# Patient Record
Sex: MALE | Race: WHITE | NOT HISPANIC OR LATINO | Employment: FULL TIME | URBAN - METROPOLITAN AREA
[De-identification: names, ages, dates, MRNs, and addresses within clinical notes are randomized per-mention and may not be internally consistent; named-entity substitution may affect disease eponyms.]

---

## 2017-05-08 DIAGNOSIS — J45.40 MODERATE PERSISTENT ASTHMA WITHOUT COMPLICATION: ICD-10-CM

## 2017-05-08 RX ORDER — FLUTICASONE PROPIONATE AND SALMETEROL XINAFOATE 230; 21 UG/1; UG/1
AEROSOL, METERED RESPIRATORY (INHALATION)
Qty: 3 INHALER | Refills: 0 | Status: SHIPPED | OUTPATIENT
Start: 2017-05-08 | End: 2017-08-06

## 2017-05-08 RX ORDER — MONTELUKAST SODIUM 10 MG/1
TABLET ORAL
Qty: 90 TABLET | Refills: 0 | Status: SHIPPED | OUTPATIENT
Start: 2017-05-08 | End: 2017-12-28 | Stop reason: SDUPTHER

## 2017-06-06 DIAGNOSIS — R73.03 PREDIABETES: ICD-10-CM

## 2017-06-06 DIAGNOSIS — E55.9 VITAMIN D DEFICIENCY: ICD-10-CM

## 2017-06-06 DIAGNOSIS — E78.5 HYPERLIPIDEMIA, UNSPECIFIED HYPERLIPIDEMIA TYPE: ICD-10-CM

## 2017-06-06 DIAGNOSIS — R74.01 ELEVATED ALT MEASUREMENT: ICD-10-CM

## 2017-06-30 LAB
25(OH)D3+25(OH)D2 SERPL-MCNC: 16.9 NG/ML (ref 30–100)
ALBUMIN SERPL-MCNC: 4.1 G/DL (ref 3.5–5.2)
ALBUMIN/GLOB SERPL: 1.5 G/DL
ALP SERPL-CCNC: 66 U/L (ref 39–117)
ALT SERPL-CCNC: 43 U/L (ref 1–41)
AST SERPL-CCNC: 23 U/L (ref 1–40)
BASOPHILS # BLD AUTO: 0.14 10*3/MM3 (ref 0–0.2)
BASOPHILS NFR BLD AUTO: 1.4 % (ref 0–1.5)
BILIRUB SERPL-MCNC: 0.2 MG/DL (ref 0.1–1.2)
BUN SERPL-MCNC: 13 MG/DL (ref 6–20)
BUN/CREAT SERPL: 16.9 (ref 7–25)
CALCIUM SERPL-MCNC: 9.3 MG/DL (ref 8.6–10.5)
CHLORIDE SERPL-SCNC: 104 MMOL/L (ref 98–107)
CHOLEST SERPL-MCNC: 191 MG/DL (ref 0–200)
CO2 SERPL-SCNC: 25.2 MMOL/L (ref 22–29)
CREAT SERPL-MCNC: 0.77 MG/DL (ref 0.76–1.27)
EOSINOPHIL # BLD AUTO: 0.39 10*3/MM3 (ref 0–0.7)
EOSINOPHIL NFR BLD AUTO: 4 % (ref 0.3–6.2)
ERYTHROCYTE [DISTWIDTH] IN BLOOD BY AUTOMATED COUNT: 13.9 % (ref 11.5–14.5)
GLOBULIN SER CALC-MCNC: 2.8 GM/DL
GLUCOSE SERPL-MCNC: 128 MG/DL (ref 65–99)
HBA1C MFR BLD: 5.98 % (ref 4.8–5.6)
HCT VFR BLD AUTO: 47.2 % (ref 40.4–52.2)
HDLC SERPL-MCNC: 41 MG/DL (ref 40–60)
HGB BLD-MCNC: 15.2 G/DL (ref 13.7–17.6)
IMM GRANULOCYTES # BLD: 0.02 10*3/MM3 (ref 0–0.03)
IMM GRANULOCYTES NFR BLD: 0.2 % (ref 0–0.5)
LDLC SERPL CALC-MCNC: 107 MG/DL (ref 0–100)
LDLC/HDLC SERPL: 2.6 {RATIO}
LYMPHOCYTES # BLD AUTO: 2.61 10*3/MM3 (ref 0.9–4.8)
LYMPHOCYTES NFR BLD AUTO: 26.9 % (ref 19.6–45.3)
MCH RBC QN AUTO: 27.4 PG (ref 27–32.7)
MCHC RBC AUTO-ENTMCNC: 32.2 G/DL (ref 32.6–36.4)
MCV RBC AUTO: 85 FL (ref 79.8–96.2)
MONOCYTES # BLD AUTO: 1.15 10*3/MM3 (ref 0.2–1.2)
MONOCYTES NFR BLD AUTO: 11.8 % (ref 5–12)
NEUTROPHILS # BLD AUTO: 5.4 10*3/MM3 (ref 1.9–8.1)
NEUTROPHILS NFR BLD AUTO: 55.7 % (ref 42.7–76)
PLATELET # BLD AUTO: 257 10*3/MM3 (ref 140–500)
POTASSIUM SERPL-SCNC: 4.6 MMOL/L (ref 3.5–5.2)
PROT SERPL-MCNC: 6.9 G/DL (ref 6–8.5)
RBC # BLD AUTO: 5.55 10*6/MM3 (ref 4.6–6)
SODIUM SERPL-SCNC: 143 MMOL/L (ref 136–145)
TRIGL SERPL-MCNC: 216 MG/DL (ref 0–150)
TSH SERPL DL<=0.005 MIU/L-ACNC: 2.32 MIU/ML (ref 0.27–4.2)
VLDLC SERPL CALC-MCNC: 43.2 MG/DL (ref 5–40)
WBC # BLD AUTO: 9.71 10*3/MM3 (ref 4.5–10.7)

## 2017-07-07 ENCOUNTER — OFFICE VISIT (OUTPATIENT)
Dept: INTERNAL MEDICINE | Facility: CLINIC | Age: 47
End: 2017-07-07

## 2017-07-07 VITALS
WEIGHT: 272.4 LBS | BODY MASS INDEX: 38.14 KG/M2 | HEIGHT: 71 IN | DIASTOLIC BLOOD PRESSURE: 84 MMHG | SYSTOLIC BLOOD PRESSURE: 130 MMHG | HEART RATE: 93 BPM | OXYGEN SATURATION: 97 %

## 2017-07-07 DIAGNOSIS — R73.03 PREDIABETES: Primary | ICD-10-CM

## 2017-07-07 DIAGNOSIS — Z82.49 FAMILY HISTORY OF HEART DISEASE: ICD-10-CM

## 2017-07-07 DIAGNOSIS — R74.01 ELEVATED ALT MEASUREMENT: ICD-10-CM

## 2017-07-07 DIAGNOSIS — E55.9 VITAMIN D DEFICIENCY: ICD-10-CM

## 2017-07-07 DIAGNOSIS — E66.01 MORBID OBESITY DUE TO EXCESS CALORIES (HCC): ICD-10-CM

## 2017-07-07 PROCEDURE — 99214 OFFICE O/P EST MOD 30 MIN: CPT | Performed by: NURSE PRACTITIONER

## 2017-07-07 RX ORDER — ERGOCALCIFEROL 1.25 MG/1
50000 CAPSULE ORAL WEEKLY
Qty: 4 CAPSULE | Refills: 1 | Status: SHIPPED | OUTPATIENT
Start: 2017-07-07 | End: 2017-08-26

## 2017-07-07 NOTE — PROGRESS NOTES
Subjective   Georgi Khanna is a 47 y.o. male who is here to follow up on PreDM, Obesity, and Hyperlipidemia.     History of Present Illness   The patient is here today to F/U on lab work. He is feeling ok.   He is still working a lot of hours at [employer]. He is not doing well with eating well. Still drinking sodas and a lot of caffeine. Just interviewed with Calin Ascencio.     He has not had a sleep study yet.     Asthma- doing well with allergy meds, some difficulty with the heat.     Vit D def- pt is unsure what dose he is taking daily  The following portions of the patient's history were reviewed and updated as appropriate: allergies, current medications, past family history, past medical history, past social history, past surgical history and problem list.    Review of Systems   Constitutional: Negative.    Respiratory: Negative.    Cardiovascular: Negative.    Psychiatric/Behavioral: Negative.    All other systems reviewed and are negative.      Objective   Physical Exam   Constitutional: He appears well-developed and well-nourished.   Neck: Normal range of motion. Neck supple. No thyromegaly present.   Cardiovascular: Normal rate, regular rhythm, normal heart sounds and intact distal pulses.    Pulmonary/Chest: Effort normal and breath sounds normal.   Skin: Skin is warm and dry.   Psychiatric: He has a normal mood and affect. His behavior is normal. Judgment and thought content normal.   EKG- NSR, no baseline for comparison    Assessment/Plan    Orders Only on 06/06/2017   Component Date Value Ref Range Status   • WBC 06/30/2017 9.71  4.50 - 10.70 10*3/mm3 Final   • RBC 06/30/2017 5.55  4.60 - 6.00 10*6/mm3 Final   • Hemoglobin 06/30/2017 15.2  13.7 - 17.6 g/dL Final   • Hematocrit 06/30/2017 47.2  40.4 - 52.2 % Final   • MCV 06/30/2017 85.0  79.8 - 96.2 fL Final   • MCH 06/30/2017 27.4  27.0 - 32.7 pg Final   • MCHC 06/30/2017 32.2* 32.6 - 36.4 g/dL Final   • RDW 06/30/2017 13.9  11.5 - 14.5 % Final   • Platelets  06/30/2017 257  140 - 500 10*3/mm3 Final   • Neutrophil Rel % 06/30/2017 55.7  42.7 - 76.0 % Final   • Lymphocyte Rel % 06/30/2017 26.9  19.6 - 45.3 % Final   • Monocyte Rel % 06/30/2017 11.8  5.0 - 12.0 % Final   • Eosinophil Rel % 06/30/2017 4.0  0.3 - 6.2 % Final   • Basophil Rel % 06/30/2017 1.4  0.0 - 1.5 % Final   • Neutrophils Absolute 06/30/2017 5.40  1.90 - 8.10 10*3/mm3 Final   • Lymphocytes Absolute 06/30/2017 2.61  0.90 - 4.80 10*3/mm3 Final   • Monocytes Absolute 06/30/2017 1.15  0.20 - 1.20 10*3/mm3 Final   • Eosinophils Absolute 06/30/2017 0.39  0.00 - 0.70 10*3/mm3 Final   • Basophils Absolute 06/30/2017 0.14  0.00 - 0.20 10*3/mm3 Final   • Immature Granulocyte Rel % 06/30/2017 0.2  0.0 - 0.5 % Final   • Immature Grans Absolute 06/30/2017 0.02  0.00 - 0.03 10*3/mm3 Final   • Glucose 06/30/2017 128* 65 - 99 mg/dL Final   • BUN 06/30/2017 13  6 - 20 mg/dL Final   • Creatinine 06/30/2017 0.77  0.76 - 1.27 mg/dL Final   • eGFR Non African Am 06/30/2017 109  >60 mL/min/1.73 Final   • eGFR African Am 06/30/2017 132  >60 mL/min/1.73 Final   • BUN/Creatinine Ratio 06/30/2017 16.9  7.0 - 25.0 Final   • Sodium 06/30/2017 143  136 - 145 mmol/L Final   • Potassium 06/30/2017 4.6  3.5 - 5.2 mmol/L Final   • Chloride 06/30/2017 104  98 - 107 mmol/L Final   • Total CO2 06/30/2017 25.2  22.0 - 29.0 mmol/L Final   • Calcium 06/30/2017 9.3  8.6 - 10.5 mg/dL Final   • Total Protein 06/30/2017 6.9  6.0 - 8.5 g/dL Final   • Albumin 06/30/2017 4.10  3.50 - 5.20 g/dL Final   • Globulin 06/30/2017 2.8  gm/dL Final   • A/G Ratio 06/30/2017 1.5  g/dL Final   • Total Bilirubin 06/30/2017 0.2  0.1 - 1.2 mg/dL Final   • Alkaline Phosphatase 06/30/2017 66  39 - 117 U/L Final   • AST (SGOT) 06/30/2017 23  1 - 40 U/L Final   • ALT (SGPT) 06/30/2017 43* 1 - 41 U/L Final   • Total Cholesterol 06/30/2017 191  0 - 200 mg/dL Final   • Triglycerides 06/30/2017 216* 0 - 150 mg/dL Final   • HDL Cholesterol 06/30/2017 41  40 - 60 mg/dL  Final   • VLDL Cholesterol 06/30/2017 43.2* 5 - 40 mg/dL Final   • LDL Cholesterol  06/30/2017 107* 0 - 100 mg/dL Final   • LDL/HDL Ratio 06/30/2017 2.60   Final   • TSH 06/30/2017 2.32  0.27 - 4.2 mIU/mL Final   • 25 Hydroxy, Vitamin D 06/30/2017 16.9* 30.0 - 100.0 ng/mL Final    Comment: Reference Range for Total Vitamin D 25(OH)  Deficiency    <20.0 ng/mL  Insufficiency 21-29 ng/mL  Sufficiency    ng/mL  Toxicity      >100 ng/ml        • Hemoglobin A1C 06/30/2017 5.98* 4.80 - 5.60 % Final    Comment: Hemoglobin A1C Ranges:  Increased Risk for Diabetes  5.7% to 6.4%  Diabetes                     >= 6.5%  Diabetic Goal                < 7.0%       There are no diagnoses linked to this encounter.    1. Prediabetes- stable, pt needs to work on wt loss  2. Vit D def- pt will call with OTC dosage  3. Elevated ALT- continue to work on wt loss to lower this, ETOH/tylenol sparingly  4. Obesity- encouraged cutting out sodas, also suggest calorie tracking.   5. Fam HX of heart disease- check EKG today

## 2017-08-29 DIAGNOSIS — J45.40 MODERATE PERSISTENT ASTHMA WITHOUT COMPLICATION: ICD-10-CM

## 2017-08-29 RX ORDER — ALBUTEROL SULFATE 90 UG/1
AEROSOL, METERED RESPIRATORY (INHALATION)
Qty: 1 INHALER | Refills: 0 | Status: SHIPPED | OUTPATIENT
Start: 2017-08-29 | End: 2018-01-23 | Stop reason: SDUPTHER

## 2017-12-28 DIAGNOSIS — J45.40 MODERATE PERSISTENT ASTHMA WITHOUT COMPLICATION: ICD-10-CM

## 2017-12-28 RX ORDER — MONTELUKAST SODIUM 10 MG/1
TABLET ORAL
Qty: 90 TABLET | Refills: 0 | Status: SHIPPED | OUTPATIENT
Start: 2017-12-28 | End: 2018-01-22 | Stop reason: SDUPTHER

## 2018-01-07 ENCOUNTER — RESULTS ENCOUNTER (OUTPATIENT)
Dept: INTERNAL MEDICINE | Facility: CLINIC | Age: 48
End: 2018-01-07

## 2018-01-07 DIAGNOSIS — E55.9 VITAMIN D DEFICIENCY: ICD-10-CM

## 2018-01-07 DIAGNOSIS — R74.01 ELEVATED ALT MEASUREMENT: ICD-10-CM

## 2018-01-07 DIAGNOSIS — R73.03 PREDIABETES: ICD-10-CM

## 2018-01-07 DIAGNOSIS — E66.01 MORBID OBESITY DUE TO EXCESS CALORIES (HCC): ICD-10-CM

## 2018-01-15 LAB
25(OH)D3+25(OH)D2 SERPL-MCNC: 18.4 NG/ML (ref 30–100)
ALBUMIN SERPL-MCNC: 4.6 G/DL (ref 3.5–5.2)
ALBUMIN/GLOB SERPL: 1.6 G/DL
ALP SERPL-CCNC: 67 U/L (ref 39–117)
ALT SERPL-CCNC: 42 U/L (ref 1–41)
AST SERPL-CCNC: 25 U/L (ref 1–40)
BILIRUB SERPL-MCNC: 0.4 MG/DL (ref 0.1–1.2)
BUN SERPL-MCNC: 16 MG/DL (ref 6–20)
BUN/CREAT SERPL: 19.3 (ref 7–25)
CALCIUM SERPL-MCNC: 9.2 MG/DL (ref 8.6–10.5)
CHLORIDE SERPL-SCNC: 103 MMOL/L (ref 98–107)
CHOLEST SERPL-MCNC: 189 MG/DL (ref 0–200)
CO2 SERPL-SCNC: 28.5 MMOL/L (ref 22–29)
CREAT SERPL-MCNC: 0.83 MG/DL (ref 0.76–1.27)
GLOBULIN SER CALC-MCNC: 2.9 GM/DL
GLUCOSE SERPL-MCNC: 121 MG/DL (ref 65–99)
HBA1C MFR BLD: 5.9 % (ref 4.8–5.6)
HDLC SERPL-MCNC: 46 MG/DL (ref 40–60)
LDLC SERPL CALC-MCNC: 116 MG/DL (ref 0–100)
LDLC/HDLC SERPL: 2.52 {RATIO}
POTASSIUM SERPL-SCNC: 4.7 MMOL/L (ref 3.5–5.2)
PROT SERPL-MCNC: 7.5 G/DL (ref 6–8.5)
SODIUM SERPL-SCNC: 144 MMOL/L (ref 136–145)
TRIGL SERPL-MCNC: 136 MG/DL (ref 0–150)
VLDLC SERPL CALC-MCNC: 27.2 MG/DL (ref 5–40)

## 2018-01-22 ENCOUNTER — OFFICE VISIT (OUTPATIENT)
Dept: INTERNAL MEDICINE | Facility: CLINIC | Age: 48
End: 2018-01-22

## 2018-01-22 ENCOUNTER — TELEPHONE (OUTPATIENT)
Dept: INTERNAL MEDICINE | Facility: CLINIC | Age: 48
End: 2018-01-22

## 2018-01-22 VITALS
WEIGHT: 277.31 LBS | BODY MASS INDEX: 38.82 KG/M2 | OXYGEN SATURATION: 98 % | HEIGHT: 71 IN | SYSTOLIC BLOOD PRESSURE: 142 MMHG | DIASTOLIC BLOOD PRESSURE: 82 MMHG | HEART RATE: 102 BPM

## 2018-01-22 DIAGNOSIS — R73.03 PREDIABETES: Primary | ICD-10-CM

## 2018-01-22 DIAGNOSIS — E66.01 MORBID OBESITY DUE TO EXCESS CALORIES (HCC): ICD-10-CM

## 2018-01-22 DIAGNOSIS — J45.20 MILD INTERMITTENT ASTHMA WITHOUT COMPLICATION: ICD-10-CM

## 2018-01-22 DIAGNOSIS — J45.40 MODERATE PERSISTENT ASTHMA WITHOUT COMPLICATION: ICD-10-CM

## 2018-01-22 DIAGNOSIS — R03.0 ELEVATED BLOOD PRESSURE READING: ICD-10-CM

## 2018-01-22 DIAGNOSIS — E55.9 VITAMIN D DEFICIENCY: ICD-10-CM

## 2018-01-22 PROCEDURE — 99214 OFFICE O/P EST MOD 30 MIN: CPT | Performed by: NURSE PRACTITIONER

## 2018-01-22 RX ORDER — MONTELUKAST SODIUM 10 MG/1
10 TABLET ORAL NIGHTLY
Qty: 90 TABLET | Refills: 2 | Status: SHIPPED | OUTPATIENT
Start: 2018-01-22 | End: 2018-01-23 | Stop reason: SDUPTHER

## 2018-01-22 RX ORDER — ERGOCALCIFEROL 1.25 MG/1
50000 CAPSULE ORAL WEEKLY
Qty: 4 CAPSULE | Refills: 1 | Status: SHIPPED | OUTPATIENT
Start: 2018-01-22 | End: 2018-03-13

## 2018-01-22 RX ORDER — MONTELUKAST SODIUM 10 MG/1
10 TABLET ORAL NIGHTLY
Qty: 90 TABLET | Refills: 2 | Status: SHIPPED | OUTPATIENT
Start: 2018-01-22 | End: 2018-01-22 | Stop reason: SDUPTHER

## 2018-01-22 NOTE — PROGRESS NOTES
Subjective   Georgi Khanna is a 47 y.o. male.     History of Present Illness   The patient is here today to follow-up on lab work. He is feeling well.   Asthma- breathing well, has not used rescue inhaler at all.     Still working at [employer] working a lot until December.   The following portions of the patient's history were reviewed and updated as appropriate: allergies, current medications, past family history, past medical history, past social history, past surgical history and problem list.    Review of Systems   Constitutional: Negative.    Respiratory: Negative.    Cardiovascular: Negative.    Psychiatric/Behavioral: Negative.        Objective   Physical Exam   Constitutional: He appears well-developed and well-nourished.   Neck: Normal range of motion. Neck supple. No thyromegaly present.   Cardiovascular: Normal rate, regular rhythm, normal heart sounds and intact distal pulses.    Pulmonary/Chest: Effort normal and breath sounds normal.   Skin: Skin is warm and dry.   Psychiatric: He has a normal mood and affect. His behavior is normal. Judgment and thought content normal.       Assessment/Plan   There are no diagnoses linked to this encounter.    1. Prediabetes- stable, work on wt loss, has a new gym membership  2. Asthma- refill ventolin and singulair, try lower dose of Adviar   3. Elevated ALT- work on wt loss, tylenol, ETOH sparingly  4. Vit d def- Rx Vit D, then take Vit D3 5000 Iu daily  5. Obesity- suggest Saxenda, he will try exercise first for a 12 week period.   6. Elevated blood pressure- running well at home, he will call for syst >140 ir diast>90    Flu vaccine- recommended

## 2018-01-22 NOTE — TELEPHONE ENCOUNTER
----- Message from Latisha Cesar sent at 1/22/2018  2:39 PM EST -----  Regarding: RX ERROR  Patient was seen today. His medications, Advair and Singular was sent to [facility] but should go to his local pharmacy. He [facility]. Thanks

## 2018-01-23 ENCOUNTER — TELEPHONE (OUTPATIENT)
Dept: INTERNAL MEDICINE | Facility: CLINIC | Age: 48
End: 2018-01-23

## 2018-01-23 DIAGNOSIS — J45.40 MODERATE PERSISTENT ASTHMA WITHOUT COMPLICATION: ICD-10-CM

## 2018-01-23 DIAGNOSIS — J45.20 MILD INTERMITTENT ASTHMA WITHOUT COMPLICATION: ICD-10-CM

## 2018-01-23 RX ORDER — ALBUTEROL SULFATE 90 UG/1
2 AEROSOL, METERED RESPIRATORY (INHALATION) EVERY 6 HOURS PRN
Qty: 1 INHALER | Refills: 3 | Status: SHIPPED | OUTPATIENT
Start: 2018-01-23 | End: 2020-03-26 | Stop reason: SDUPTHER

## 2018-01-23 RX ORDER — MONTELUKAST SODIUM 10 MG/1
10 TABLET ORAL NIGHTLY
Qty: 90 TABLET | Refills: 2 | Status: SHIPPED | OUTPATIENT
Start: 2018-01-23 | End: 2020-03-26 | Stop reason: SDUPTHER

## 2018-01-23 NOTE — TELEPHONE ENCOUNTER
----- Message from Tammie Gonzales sent at 1/23/2018  3:39 PM EST -----  All of patients medications except for vitamin D was sent to mail order pharmacy. He needs them to all go to [facility]. I told him that I have it on record that they all went to [facility] and he said that they did not have them and that the mail order pharmacy called him and said that they had them.

## 2018-01-29 DIAGNOSIS — J45.20 MILD INTERMITTENT ASTHMA WITHOUT COMPLICATION: ICD-10-CM

## 2018-02-21 DIAGNOSIS — E55.9 VITAMIN D DEFICIENCY: ICD-10-CM

## 2018-03-15 DIAGNOSIS — E55.9 VITAMIN D DEFICIENCY: ICD-10-CM

## 2018-03-15 RX ORDER — ERGOCALCIFEROL 1.25 MG/1
50000 CAPSULE ORAL WEEKLY
Qty: 4 CAPSULE | Refills: 1 | OUTPATIENT
Start: 2018-03-15 | End: 2018-05-04

## 2018-04-16 ENCOUNTER — OFFICE VISIT (OUTPATIENT)
Dept: INTERNAL MEDICINE | Facility: CLINIC | Age: 48
End: 2018-04-16

## 2018-04-16 VITALS
SYSTOLIC BLOOD PRESSURE: 140 MMHG | WEIGHT: 233 LBS | HEIGHT: 71 IN | BODY MASS INDEX: 32.62 KG/M2 | HEART RATE: 85 BPM | DIASTOLIC BLOOD PRESSURE: 76 MMHG | OXYGEN SATURATION: 98 %

## 2018-04-16 DIAGNOSIS — E66.01 MORBID OBESITY DUE TO EXCESS CALORIES (HCC): ICD-10-CM

## 2018-04-16 DIAGNOSIS — E55.9 VITAMIN D DEFICIENCY: ICD-10-CM

## 2018-04-16 DIAGNOSIS — Z78.9 VEGETARIAN DIET: ICD-10-CM

## 2018-04-16 DIAGNOSIS — R03.0 ELEVATED BLOOD PRESSURE READING: Primary | ICD-10-CM

## 2018-04-16 DIAGNOSIS — R73.03 PREDIABETES: ICD-10-CM

## 2018-04-16 DIAGNOSIS — R74.01 ELEVATED ALT MEASUREMENT: ICD-10-CM

## 2018-04-16 PROCEDURE — 99214 OFFICE O/P EST MOD 30 MIN: CPT | Performed by: NURSE PRACTITIONER

## 2018-04-16 NOTE — PROGRESS NOTES
Subjective   Georgi Khanna is a 47 y.o. male here for a follow up on elevated BP and obesity.    History of Present Illness   The patient is here today to F/U on HTN and obesity. At last visit discussed wt loss meds, pt preferred lifestyle changes only. He has lost 34 lbs in the last 12 weeks. He has been focusing on smaller portions, listening to hunger cues and focusing on healthier food choices. He has cut back on protein, chicken and veggie options.   BP better at home running 120/80s.   He is exercising 3 days a week, total gym.   The following portions of the patient's history were reviewed and updated as appropriate: allergies, current medications, past family history, past medical history, past social history, past surgical history and problem list.    Review of Systems   Constitutional: Negative.    Respiratory: Negative.    Cardiovascular: Negative.    Allergic/Immunologic: Positive for environmental allergies.   Psychiatric/Behavioral: Negative.        Objective   Physical Exam   Constitutional: He appears well-developed and well-nourished.   Neck: Normal range of motion. Neck supple. No thyromegaly present.   Cardiovascular: Normal rate, regular rhythm, normal heart sounds and intact distal pulses.    Pulmonary/Chest: Effort normal and breath sounds normal.   Skin: Skin is warm and dry.   Psychiatric: He has a normal mood and affect. His behavior is normal. Judgment and thought content normal.       Assessment/Plan   There are no diagnoses linked to this encounter.    1. Elevated BP- running better with wt loss, continue same.   2. Vegetarian diet- start B12 vitamin, check that he is getting enough calcium.   3. Vit D def- check level today, taking 5000 IU daily  4. Obesity- continue to work on wt loss through healthy dietary choices and exercise.Wt loss goal for the next 12 weeks, 15 lbs.

## 2018-07-16 ENCOUNTER — RESULTS ENCOUNTER (OUTPATIENT)
Dept: INTERNAL MEDICINE | Facility: CLINIC | Age: 48
End: 2018-07-16

## 2018-07-16 DIAGNOSIS — Z78.9 VEGETARIAN DIET: ICD-10-CM

## 2018-07-16 DIAGNOSIS — E66.01 MORBID OBESITY DUE TO EXCESS CALORIES (HCC): ICD-10-CM

## 2018-07-16 DIAGNOSIS — E55.9 VITAMIN D DEFICIENCY: ICD-10-CM

## 2018-07-16 DIAGNOSIS — R73.03 PREDIABETES: ICD-10-CM

## 2018-07-16 DIAGNOSIS — R74.01 ELEVATED ALT MEASUREMENT: ICD-10-CM

## 2019-03-02 DIAGNOSIS — J45.40 MODERATE PERSISTENT ASTHMA WITHOUT COMPLICATION: ICD-10-CM

## 2019-03-04 RX ORDER — MONTELUKAST SODIUM 10 MG/1
10 TABLET ORAL NIGHTLY
Qty: 90 TABLET | Refills: 2 | OUTPATIENT
Start: 2019-03-04

## 2019-03-21 DIAGNOSIS — J45.20 MILD INTERMITTENT ASTHMA WITHOUT COMPLICATION: ICD-10-CM

## 2019-03-21 RX ORDER — FLUTICASONE PROPIONATE AND SALMETEROL XINAFOATE 115; 21 UG/1; UG/1
AEROSOL, METERED RESPIRATORY (INHALATION)
Qty: 36 INHALER | Refills: 1 | OUTPATIENT
Start: 2019-03-21

## 2019-03-26 ENCOUNTER — TELEPHONE (OUTPATIENT)
Dept: INTERNAL MEDICINE | Facility: CLINIC | Age: 49
End: 2019-03-26

## 2019-03-26 NOTE — TELEPHONE ENCOUNTER
ASSESSMENT: Called patient to let him know he is due for labs and a follow up appointment and he said he needs to look at his schedule and then he will call back and schedule.

## 2019-07-24 ENCOUNTER — HOSPITAL ENCOUNTER (EMERGENCY)
Facility: HOSPITAL | Age: 49
Discharge: HOME | End: 2019-07-24
Attending: EMERGENCY MEDICINE | Admitting: EMERGENCY MEDICINE

## 2019-07-24 ENCOUNTER — APPOINTMENT (OUTPATIENT)
Dept: CT IMAGING | Facility: HOSPITAL | Age: 49
End: 2019-07-24

## 2019-07-24 VITALS
BODY MASS INDEX: 33.36 KG/M2 | OXYGEN SATURATION: 96 % | RESPIRATION RATE: 16 BRPM | TEMPERATURE: 96.6 F | SYSTOLIC BLOOD PRESSURE: 139 MMHG | HEIGHT: 70 IN | HEART RATE: 75 BPM | DIASTOLIC BLOOD PRESSURE: 98 MMHG | WEIGHT: 233 LBS

## 2019-07-24 DIAGNOSIS — R42 ORTHOSTATIC DIZZINESS: Primary | ICD-10-CM

## 2019-07-24 DIAGNOSIS — R03.0 ELEVATED BLOOD PRESSURE READING: ICD-10-CM

## 2019-07-24 LAB
ANION GAP SERPL CALCULATED.3IONS-SCNC: 9.6 MMOL/L (ref 5–15)
BASOPHILS # BLD AUTO: 0.2 10*3/MM3 (ref 0–0.2)
BASOPHILS NFR BLD AUTO: 1.7 % (ref 0–1.5)
BUN BLD-MCNC: 13 MG/DL (ref 6–20)
BUN/CREAT SERPL: 17.6 (ref 7–25)
CALCIUM SPEC-SCNC: 8.9 MG/DL (ref 8.6–10.5)
CHLORIDE SERPL-SCNC: 99 MMOL/L (ref 98–107)
CO2 SERPL-SCNC: 27.4 MMOL/L (ref 22–29)
CREAT BLD-MCNC: 0.74 MG/DL (ref 0.76–1.27)
DEPRECATED RDW RBC AUTO: 41.2 FL (ref 37–54)
EOSINOPHIL # BLD AUTO: 0.44 10*3/MM3 (ref 0–0.4)
EOSINOPHIL NFR BLD AUTO: 3.7 % (ref 0.3–6.2)
ERYTHROCYTE [DISTWIDTH] IN BLOOD BY AUTOMATED COUNT: 13.4 % (ref 12.3–15.4)
GFR SERPL CREATININE-BSD FRML MDRD: 113 ML/MIN/1.73
GLUCOSE BLD-MCNC: 91 MG/DL (ref 65–99)
HCT VFR BLD AUTO: 50.6 % (ref 37.5–51)
HGB BLD-MCNC: 15.9 G/DL (ref 13–17.7)
IMM GRANULOCYTES # BLD AUTO: 0.05 10*3/MM3 (ref 0–0.05)
IMM GRANULOCYTES NFR BLD AUTO: 0.4 % (ref 0–0.5)
LYMPHOCYTES # BLD AUTO: 3.16 10*3/MM3 (ref 0.7–3.1)
LYMPHOCYTES NFR BLD AUTO: 26.7 % (ref 19.6–45.3)
MCH RBC QN AUTO: 26.2 PG (ref 26.6–33)
MCHC RBC AUTO-ENTMCNC: 31.4 G/DL (ref 31.5–35.7)
MCV RBC AUTO: 83.5 FL (ref 79–97)
MONOCYTES # BLD AUTO: 1.21 10*3/MM3 (ref 0.1–0.9)
MONOCYTES NFR BLD AUTO: 10.2 % (ref 5–12)
NEUTROPHILS # BLD AUTO: 6.79 10*3/MM3 (ref 1.7–7)
NEUTROPHILS NFR BLD AUTO: 57.3 % (ref 42.7–76)
NRBC BLD AUTO-RTO: 0 /100 WBC (ref 0–0.2)
PLATELET # BLD AUTO: 324 10*3/MM3 (ref 140–450)
PMV BLD AUTO: 9.1 FL (ref 6–12)
POTASSIUM BLD-SCNC: 4.3 MMOL/L (ref 3.5–5.2)
RBC # BLD AUTO: 6.06 10*6/MM3 (ref 4.14–5.8)
SODIUM BLD-SCNC: 136 MMOL/L (ref 136–145)
TROPONIN T SERPL-MCNC: <0.01 NG/ML (ref 0–0.03)
WBC NRBC COR # BLD: 11.85 10*3/MM3 (ref 3.4–10.8)

## 2019-07-24 PROCEDURE — 93005 ELECTROCARDIOGRAM TRACING: CPT | Performed by: EMERGENCY MEDICINE

## 2019-07-24 PROCEDURE — 84484 ASSAY OF TROPONIN QUANT: CPT | Performed by: EMERGENCY MEDICINE

## 2019-07-24 PROCEDURE — 85025 COMPLETE CBC W/AUTO DIFF WBC: CPT | Performed by: EMERGENCY MEDICINE

## 2019-07-24 PROCEDURE — 70450 CT HEAD/BRAIN W/O DYE: CPT

## 2019-07-24 PROCEDURE — 80048 BASIC METABOLIC PNL TOTAL CA: CPT | Performed by: EMERGENCY MEDICINE

## 2019-07-24 PROCEDURE — 93010 ELECTROCARDIOGRAM REPORT: CPT | Performed by: INTERNAL MEDICINE

## 2019-07-24 PROCEDURE — 99284 EMERGENCY DEPT VISIT MOD MDM: CPT

## 2019-07-24 RX ORDER — SODIUM CHLORIDE 0.9 % (FLUSH) 0.9 %
10 SYRINGE (ML) INJECTION AS NEEDED
Status: DISCONTINUED | OUTPATIENT
Start: 2019-07-24 | End: 2019-07-24 | Stop reason: HOSPADM

## 2019-07-24 RX ORDER — MECLIZINE HYDROCHLORIDE 25 MG/1
25 TABLET ORAL 3 TIMES DAILY PRN
Qty: 9 TABLET | Refills: 0 | Status: SHIPPED | OUTPATIENT
Start: 2019-07-24 | End: 2019-07-27

## 2019-07-24 RX ADMIN — SODIUM CHLORIDE, POTASSIUM CHLORIDE, SODIUM LACTATE AND CALCIUM CHLORIDE 1000 ML: 600; 310; 30; 20 INJECTION, SOLUTION INTRAVENOUS at 19:42

## 2019-07-29 ENCOUNTER — OFFICE VISIT (OUTPATIENT)
Dept: INTERNAL MEDICINE | Facility: CLINIC | Age: 49
End: 2019-07-29

## 2019-07-29 VITALS
HEART RATE: 113 BPM | SYSTOLIC BLOOD PRESSURE: 134 MMHG | TEMPERATURE: 98.7 F | BODY MASS INDEX: 37.95 KG/M2 | HEIGHT: 70 IN | WEIGHT: 265.1 LBS | OXYGEN SATURATION: 98 % | DIASTOLIC BLOOD PRESSURE: 90 MMHG

## 2019-07-29 DIAGNOSIS — R53.83 FATIGUE, UNSPECIFIED TYPE: ICD-10-CM

## 2019-07-29 DIAGNOSIS — R06.83 SNORING: ICD-10-CM

## 2019-07-29 DIAGNOSIS — J45.20 MILD INTERMITTENT ASTHMA WITHOUT COMPLICATION: ICD-10-CM

## 2019-07-29 DIAGNOSIS — E66.01 MORBID OBESITY DUE TO EXCESS CALORIES (HCC): ICD-10-CM

## 2019-07-29 DIAGNOSIS — R74.01 ELEVATED ALT MEASUREMENT: ICD-10-CM

## 2019-07-29 DIAGNOSIS — R73.03 PREDIABETES: ICD-10-CM

## 2019-07-29 DIAGNOSIS — I10 ESSENTIAL HYPERTENSION: ICD-10-CM

## 2019-07-29 DIAGNOSIS — R42 DIZZINESS: Primary | ICD-10-CM

## 2019-07-29 DIAGNOSIS — E55.9 VITAMIN D DEFICIENCY: ICD-10-CM

## 2019-07-29 DIAGNOSIS — I10 ESSENTIAL HYPERTENSION: Primary | ICD-10-CM

## 2019-07-29 DIAGNOSIS — R42 VERTIGO: ICD-10-CM

## 2019-07-29 PROCEDURE — 99214 OFFICE O/P EST MOD 30 MIN: CPT | Performed by: NURSE PRACTITIONER

## 2019-07-29 RX ORDER — MECLIZINE HYDROCHLORIDE 25 MG/1
25 TABLET ORAL 3 TIMES DAILY PRN
Qty: 30 TABLET | Refills: 2 | Status: SHIPPED | OUTPATIENT
Start: 2019-07-29 | End: 2021-12-01

## 2019-07-29 RX ORDER — HYDROCHLOROTHIAZIDE 12.5 MG/1
12.5 TABLET ORAL DAILY
Qty: 30 TABLET | Refills: 2 | Status: SHIPPED | OUTPATIENT
Start: 2019-07-29 | End: 2019-08-28 | Stop reason: SDUPTHER

## 2019-07-29 RX ORDER — MECLIZINE HYDROCHLORIDE 25 MG/1
25 TABLET ORAL 3 TIMES DAILY PRN
COMMUNITY
End: 2019-07-29

## 2019-07-29 NOTE — PROGRESS NOTES
Subjective   Georgi Khanna is a 49 y.o. male.      History of Present Illness   The patient is here today to F/U on ER visit 7/24th. Dizziness worsened by standing and better with moving around. CT head neg. Lab neg other than slightly elevated WBC.   Symptoms are persisting. The meclizine is helping some. He is still getting dizzy upon standing. Feeling worn out. Worried about this.    Kink in his neck to the left X1 month.     Dad just had a stroke, now both parents with hx of CVA.   The following portions of the patient's history were reviewed and updated as appropriate: allergies, current medications, past family history, past medical history, past social history, past surgical history and problem list.    Review of Systems   Constitutional: Negative.    Respiratory: Negative.    Cardiovascular: Negative.    Neurological: Positive for dizziness.   Psychiatric/Behavioral: Negative.        Objective   Physical Exam   Constitutional: He is oriented to person, place, and time. He appears well-developed and well-nourished.   HENT:   Pos Adair hallpike maneuver right side   Neck: Normal range of motion. Neck supple. No thyromegaly present.   Cardiovascular: Normal rate, regular rhythm, normal heart sounds and intact distal pulses.   Pulmonary/Chest: Effort normal and breath sounds normal.   Lymphadenopathy:     He has no cervical adenopathy.   Neurological: He is alert and oriented to person, place, and time. He has normal strength. No cranial nerve deficit or sensory deficit.   Skin: Skin is warm and dry.   Psychiatric: He has a normal mood and affect. His behavior is normal. Judgment and thought content normal.       Assessment/Plan   There are no diagnoses linked to this encounter.         1. Dizziness/vertigo- check carotid US, ok to continue meclizine, check MRI of the brain if symptoms persist/vestibular rehab  2. HTN- start HCTZ 12.5 mg daily, f/u in 2 weeks, notify for syst >140 <110 or diast >90, get labs  drawn  3. Snoring/fatigue- needs sleep study

## 2019-07-30 ENCOUNTER — TRANSCRIBE ORDERS (OUTPATIENT)
Dept: ADMINISTRATIVE | Facility: HOSPITAL | Age: 49
End: 2019-07-30

## 2019-07-30 DIAGNOSIS — Z13.6 ENCOUNTER FOR SCREENING FOR VASCULAR DISEASE: Primary | ICD-10-CM

## 2019-08-09 ENCOUNTER — HOSPITAL ENCOUNTER (OUTPATIENT)
Dept: CARDIOLOGY | Facility: HOSPITAL | Age: 49
Discharge: HOME | End: 2019-08-09
Admitting: NURSE PRACTITIONER

## 2019-08-09 VITALS
HEIGHT: 70 IN | DIASTOLIC BLOOD PRESSURE: 85 MMHG | SYSTOLIC BLOOD PRESSURE: 136 MMHG | BODY MASS INDEX: 37.22 KG/M2 | HEART RATE: 90 BPM | WEIGHT: 260 LBS

## 2019-08-09 DIAGNOSIS — Z13.6 ENCOUNTER FOR SCREENING FOR VASCULAR DISEASE: ICD-10-CM

## 2019-08-09 LAB
BH CV VAS BP LEFT ARM: NORMAL MMHG
BH CV VAS BP RIGHT ARM: NORMAL MMHG
BH CV XLRA MEAS LEFT ICA/CCA RATIO: 0.72
BH CV XLRA MEAS LEFT MID CCA PSV: NORMAL CM/SEC
BH CV XLRA MEAS LEFT MID ICA PSV: NORMAL CM/SEC
BH CV XLRA MEAS LEFT PROX ECA PSV: NORMAL CM/SEC
BH CV XLRA MEAS RIGHT ICA/CCA RATIO: 0.7
BH CV XLRA MEAS RIGHT MID CCA PSV: NORMAL CM/SEC
BH CV XLRA MEAS RIGHT MID ICA PSV: NORMAL CM/SEC
BH CV XLRA MEAS RIGHT PROX ECA PSV: NORMAL CM/SEC

## 2019-08-09 PROCEDURE — 93799 UNLISTED CV SVC/PROCEDURE: CPT

## 2019-08-23 LAB
25(OH)D3+25(OH)D2 SERPL-MCNC: 20.5 NG/ML (ref 30–100)
ALBUMIN SERPL-MCNC: 4.3 G/DL (ref 3.5–5.2)
ALP SERPL-CCNC: 54 U/L (ref 39–117)
ALT SERPL-CCNC: 43 U/L (ref 1–41)
AST SERPL-CCNC: 40 U/L (ref 1–40)
BILIRUB DIRECT SERPL-MCNC: <0.2 MG/DL (ref 0.2–0.3)
BILIRUB SERPL-MCNC: 0.2 MG/DL (ref 0.2–1.2)
CHOLEST SERPL-MCNC: 155 MG/DL (ref 0–200)
HBA1C MFR BLD: 5.9 % (ref 4.8–5.6)
HDLC SERPL-MCNC: 47 MG/DL (ref 40–60)
LDLC SERPL CALC-MCNC: 93 MG/DL (ref 0–100)
LDLC/HDLC SERPL: 1.97 {RATIO}
PROT SERPL-MCNC: 6.7 G/DL (ref 6–8.5)
TRIGL SERPL-MCNC: 77 MG/DL (ref 0–150)
TSH SERPL DL<=0.005 MIU/L-ACNC: 2.16 MIU/ML (ref 0.27–4.2)
VLDLC SERPL CALC-MCNC: 15.4 MG/DL

## 2019-08-28 ENCOUNTER — OFFICE VISIT (OUTPATIENT)
Dept: INTERNAL MEDICINE | Facility: CLINIC | Age: 49
End: 2019-08-28

## 2019-08-28 VITALS
BODY MASS INDEX: 39.29 KG/M2 | WEIGHT: 265.3 LBS | HEIGHT: 69 IN | SYSTOLIC BLOOD PRESSURE: 120 MMHG | TEMPERATURE: 98.2 F | OXYGEN SATURATION: 97 % | DIASTOLIC BLOOD PRESSURE: 82 MMHG | HEART RATE: 90 BPM

## 2019-08-28 DIAGNOSIS — J45.20 MILD INTERMITTENT ASTHMA WITHOUT COMPLICATION: ICD-10-CM

## 2019-08-28 DIAGNOSIS — E55.9 VITAMIN D DEFICIENCY: ICD-10-CM

## 2019-08-28 DIAGNOSIS — R73.03 PREDIABETES: Primary | ICD-10-CM

## 2019-08-28 DIAGNOSIS — I10 ESSENTIAL HYPERTENSION: ICD-10-CM

## 2019-08-28 DIAGNOSIS — R74.01 ELEVATED ALT MEASUREMENT: ICD-10-CM

## 2019-08-28 PROCEDURE — 99214 OFFICE O/P EST MOD 30 MIN: CPT | Performed by: NURSE PRACTITIONER

## 2019-08-28 RX ORDER — HYDROCHLOROTHIAZIDE 12.5 MG/1
12.5 TABLET ORAL DAILY
Qty: 90 TABLET | Refills: 1 | Status: SHIPPED | OUTPATIENT
Start: 2019-08-28 | End: 2020-05-26

## 2019-08-28 NOTE — PROGRESS NOTES
Subjective   Georgi Khanna is a 49 y.o. male.      History of Present Illness   The patient is here today to F/U on lab work.   He is feeling well.   HTN- BP running ok at home, systolic 130s/80-90  Asthma-Pt is doing well with current medication regimen, denies adverse reactions, compliant with medication schedule.     No more dizziness, feeling back to nl.   The following portions of the patient's history were reviewed and updated as appropriate: allergies, current medications, past family history, past medical history, past social history, past surgical history and problem list.    Review of Systems   Constitutional: Negative for chills and fever.   Respiratory: Negative.    Cardiovascular: Negative.    Psychiatric/Behavioral: Negative for dysphoric mood and suicidal ideas. The patient is not nervous/anxious.        Objective   Physical Exam   Constitutional: He appears well-developed and well-nourished.   Neck: Normal range of motion. Neck supple. No thyromegaly present.   Cardiovascular: Normal rate, regular rhythm, normal heart sounds and intact distal pulses.   Pulmonary/Chest: Effort normal and breath sounds normal.   Lymphadenopathy:     He has no cervical adenopathy.   Skin: Skin is warm and dry.   Psychiatric: He has a normal mood and affect. His behavior is normal. Judgment and thought content normal.       Assessment/Plan   There are no diagnoses linked to this encounter.           1. Prediabetes- stable, continue to work on wt loss  2. Elevated ALT- stable, continue to work on wt loss  3. Asthma- well controlled  4. HTN- continue same, monitor  5. Vit D def- 2000 IU daily

## 2019-12-10 ENCOUNTER — OFFICE VISIT (OUTPATIENT)
Dept: INTERNAL MEDICINE | Facility: CLINIC | Age: 49
End: 2019-12-10

## 2019-12-10 VITALS
OXYGEN SATURATION: 98 % | HEIGHT: 69 IN | DIASTOLIC BLOOD PRESSURE: 82 MMHG | SYSTOLIC BLOOD PRESSURE: 136 MMHG | BODY MASS INDEX: 39.22 KG/M2 | HEART RATE: 112 BPM | WEIGHT: 264.8 LBS | TEMPERATURE: 98.4 F

## 2019-12-10 DIAGNOSIS — R33.9 INCOMPLETE BLADDER EMPTYING: Primary | ICD-10-CM

## 2019-12-10 PROCEDURE — 99213 OFFICE O/P EST LOW 20 MIN: CPT | Performed by: NURSE PRACTITIONER

## 2019-12-10 NOTE — PROGRESS NOTES
"Subjective   Georgi Khanna is a 49 y.o. male.     History of Present Illness    The patient is here today with c/o urinary \"sputtering\" and incomplete bladder emptying. Started a few months ago.   The last physical he had he as told he had a swollen prostate and was not sent to a urologist, this was about 8 yrs ago.   No risk of STI.     No family history of prostate ca or prostate ca.     The following portions of the patient's history were reviewed and updated as appropriate: allergies, current medications, past family history, past medical history, past social history, past surgical history and problem list.    Review of Systems   Constitutional: Negative.  Negative for chills and fever.   Respiratory: Negative.    Cardiovascular: Negative.    Genitourinary: Positive for difficulty urinating and nocturia. Negative for decreased urine volume, discharge, dysuria, flank pain, frequency, genital sores, hematuria, penile pain, erectile dysfunction, penile swelling, scrotal swelling, testicular pain and urgency.   Psychiatric/Behavioral: Negative for dysphoric mood and suicidal ideas. The patient is not nervous/anxious.        Objective   Physical Exam   Constitutional: He appears well-developed and well-nourished.   Neck: Normal range of motion. Neck supple. No thyromegaly present.   Cardiovascular: Normal rate, regular rhythm, normal heart sounds and intact distal pulses.   Pulmonary/Chest: Effort normal and breath sounds normal.   Abdominal: There is no CVA tenderness.   Lymphadenopathy:     He has no cervical adenopathy.   Skin: Skin is warm and dry.   Psychiatric: He has a normal mood and affect. His behavior is normal. Judgment and thought content normal.       Vitals:    12/10/19 1554   BP: 136/82   Pulse: 112   Temp: 98.4 °F (36.9 °C)   SpO2: 98%     Body mass index is 39 kg/m².      Assessment/Plan   Georgi was seen today for urinary frequency.    Diagnoses and all orders for this visit:    Incomplete bladder " emptying  -     Ambulatory Referral to Urology  -     PSA DIAGNOSTIC                 1. Incomplete bladder emptying- check PSA, UA today if pt able, urology referral placed

## 2019-12-11 LAB — PSA SERPL-MCNC: 0.9 NG/ML (ref 0–4)

## 2020-03-03 DIAGNOSIS — E66.01 MORBID OBESITY DUE TO EXCESS CALORIES (HCC): ICD-10-CM

## 2020-03-03 DIAGNOSIS — Z00.00 ANNUAL PHYSICAL EXAM: ICD-10-CM

## 2020-03-03 DIAGNOSIS — R73.03 PREDIABETES: ICD-10-CM

## 2020-03-03 DIAGNOSIS — E55.9 VITAMIN D DEFICIENCY: ICD-10-CM

## 2020-03-03 DIAGNOSIS — E78.5 HYPERLIPIDEMIA, UNSPECIFIED HYPERLIPIDEMIA TYPE: Primary | ICD-10-CM

## 2020-03-03 DIAGNOSIS — I10 ESSENTIAL HYPERTENSION: ICD-10-CM

## 2020-03-26 ENCOUNTER — TELEPHONE (OUTPATIENT)
Dept: INTERNAL MEDICINE | Facility: CLINIC | Age: 50
End: 2020-03-26

## 2020-03-26 DIAGNOSIS — J45.40 MODERATE PERSISTENT ASTHMA WITHOUT COMPLICATION: ICD-10-CM

## 2020-03-26 RX ORDER — MONTELUKAST SODIUM 10 MG/1
10 TABLET ORAL NIGHTLY
Qty: 30 TABLET | Refills: 1 | Status: SHIPPED | OUTPATIENT
Start: 2020-03-26 | End: 2020-04-20

## 2020-03-26 RX ORDER — ALBUTEROL SULFATE 90 UG/1
2 AEROSOL, METERED RESPIRATORY (INHALATION) EVERY 6 HOURS PRN
Qty: 1 INHALER | Refills: 1 | Status: SHIPPED | OUTPATIENT
Start: 2020-03-26 | End: 2021-06-08

## 2020-03-26 NOTE — TELEPHONE ENCOUNTER
----- Message from Mary Alice Carrera sent at 3/26/2020  8:59 AM EDT -----  Patient is out of meds and needs refill RX's to be sent to [facility] for:    montelukast (SINGULAIR) 10 MG tablet  albuterol (VENTOLIN HFA) 108 (90 Base) MCG/ACT inhaler

## 2020-04-20 DIAGNOSIS — J45.40 MODERATE PERSISTENT ASTHMA WITHOUT COMPLICATION: ICD-10-CM

## 2020-04-20 RX ORDER — MONTELUKAST SODIUM 10 MG/1
TABLET ORAL
Qty: 30 TABLET | Refills: 1 | Status: SHIPPED | OUTPATIENT
Start: 2020-04-20 | End: 2021-12-01 | Stop reason: SDUPTHER

## 2020-05-26 RX ORDER — HYDROCHLOROTHIAZIDE 12.5 MG/1
TABLET ORAL
Qty: 30 TABLET | Refills: 0 | Status: SHIPPED | OUTPATIENT
Start: 2020-05-26 | End: 2021-12-01 | Stop reason: SDUPTHER

## 2020-06-17 DIAGNOSIS — J45.20 MILD INTERMITTENT ASTHMA WITHOUT COMPLICATION: ICD-10-CM

## 2020-06-17 RX ORDER — FLUTICASONE PROPIONATE AND SALMETEROL XINAFOATE 115; 21 UG/1; UG/1
AEROSOL, METERED RESPIRATORY (INHALATION)
Qty: 36 INHALER | Refills: 0 | Status: SHIPPED | OUTPATIENT
Start: 2020-06-17 | End: 2021-12-01 | Stop reason: SDUPTHER

## 2021-06-08 DIAGNOSIS — J45.40 MODERATE PERSISTENT ASTHMA WITHOUT COMPLICATION: ICD-10-CM

## 2021-06-08 RX ORDER — ALBUTEROL SULFATE 90 UG/1
AEROSOL, METERED RESPIRATORY (INHALATION)
Qty: 18 G | Refills: 1 | Status: SHIPPED | OUTPATIENT
Start: 2021-06-08 | End: 2021-12-01 | Stop reason: SDUPTHER

## 2021-12-01 ENCOUNTER — OFFICE VISIT (OUTPATIENT)
Dept: INTERNAL MEDICINE | Facility: CLINIC | Age: 51
End: 2021-12-01

## 2021-12-01 VITALS
WEIGHT: 249 LBS | DIASTOLIC BLOOD PRESSURE: 98 MMHG | BODY MASS INDEX: 35.65 KG/M2 | HEIGHT: 70 IN | HEART RATE: 96 BPM | OXYGEN SATURATION: 96 % | TEMPERATURE: 99 F | SYSTOLIC BLOOD PRESSURE: 142 MMHG

## 2021-12-01 DIAGNOSIS — Z12.5 PROSTATE CANCER SCREENING: ICD-10-CM

## 2021-12-01 DIAGNOSIS — I10 ESSENTIAL HYPERTENSION: ICD-10-CM

## 2021-12-01 DIAGNOSIS — E55.9 VITAMIN D DEFICIENCY: ICD-10-CM

## 2021-12-01 DIAGNOSIS — J45.40 MODERATE PERSISTENT ASTHMA WITHOUT COMPLICATION: ICD-10-CM

## 2021-12-01 DIAGNOSIS — Z12.11 COLON CANCER SCREENING: ICD-10-CM

## 2021-12-01 DIAGNOSIS — R73.03 PREDIABETES: ICD-10-CM

## 2021-12-01 DIAGNOSIS — J45.20 MILD INTERMITTENT ASTHMA WITHOUT COMPLICATION: ICD-10-CM

## 2021-12-01 DIAGNOSIS — E78.5 HYPERLIPIDEMIA, UNSPECIFIED HYPERLIPIDEMIA TYPE: ICD-10-CM

## 2021-12-01 DIAGNOSIS — R74.01 ELEVATED ALT MEASUREMENT: ICD-10-CM

## 2021-12-01 DIAGNOSIS — Z00.00 HEALTH CARE MAINTENANCE: Primary | ICD-10-CM

## 2021-12-01 PROCEDURE — 99396 PREV VISIT EST AGE 40-64: CPT | Performed by: NURSE PRACTITIONER

## 2021-12-01 RX ORDER — MONTELUKAST SODIUM 10 MG/1
10 TABLET ORAL
Qty: 90 TABLET | Refills: 1 | Status: SHIPPED | OUTPATIENT
Start: 2021-12-01

## 2021-12-01 RX ORDER — ALBUTEROL SULFATE 90 UG/1
2 AEROSOL, METERED RESPIRATORY (INHALATION) EVERY 4 HOURS PRN
Qty: 18 G | Refills: 1 | Status: SHIPPED | OUTPATIENT
Start: 2021-12-01 | End: 2022-03-08 | Stop reason: SDUPTHER

## 2021-12-01 RX ORDER — HYDROCHLOROTHIAZIDE 12.5 MG/1
12.5 TABLET ORAL DAILY
Qty: 90 TABLET | Refills: 1 | Status: SHIPPED | OUTPATIENT
Start: 2021-12-01

## 2021-12-01 RX ORDER — FLUTICASONE PROPIONATE AND SALMETEROL XINAFOATE 115; 21 UG/1; UG/1
2 AEROSOL, METERED RESPIRATORY (INHALATION) 2 TIMES DAILY
Qty: 12 G | Refills: 3 | Status: SHIPPED | OUTPATIENT
Start: 2021-12-01 | End: 2022-06-07

## 2021-12-01 NOTE — PROGRESS NOTES
Subjective   Georgi Khanna is a 51 y.o. male.     History of Present Illness   The patient is here today for CPE and lab work F/U. He feels good overall. Down 16 lbs in the last 2 years.     HTN- out of HCTZ  Asthma- out of med, has not needed inhaler recently     Mom passed in 2020.   The following portions of the patient's history were reviewed and updated as appropriate: allergies, current medications, past family history, past medical history, past social history, past surgical history and problem list.    Review of Systems   Constitutional: Negative for chills and fever.   HENT: Negative for ear pain, rhinorrhea and sore throat.    Eyes: Negative.    Respiratory: Negative for cough and shortness of breath.    Cardiovascular: Negative.    Gastrointestinal: Negative.    Endocrine: Negative for cold intolerance and heat intolerance.   Genitourinary: Negative for decreased urine volume, difficulty urinating, discharge, dysuria, flank pain, frequency, genital sores, hematuria, penile pain, erectile dysfunction, testicular pain and urgency.   Musculoskeletal: Negative.    Skin: Negative.    Allergic/Immunologic: Negative for environmental allergies and food allergies.   Neurological: Negative.    Hematological: Negative.    Psychiatric/Behavioral: Negative for dysphoric mood and suicidal ideas. The patient is not nervous/anxious.        Objective   Physical Exam  Constitutional:       Appearance: Normal appearance. He is well-developed.   HENT:      Right Ear: Hearing, tympanic membrane, ear canal and external ear normal.      Left Ear: Hearing, tympanic membrane, ear canal and external ear normal.   Eyes:      General: Lids are normal.      Conjunctiva/sclera: Conjunctivae normal.      Pupils: Pupils are equal, round, and reactive to light.   Neck:      Thyroid: No thyromegaly.      Vascular: No carotid bruit.      Trachea: Trachea normal.   Cardiovascular:      Rate and Rhythm: Normal rate and regular rhythm.       Heart sounds: Normal heart sounds.   Pulmonary:      Effort: Pulmonary effort is normal.      Breath sounds: Normal breath sounds.   Chest:   Breasts:      Right: No supraclavicular adenopathy.      Left: No supraclavicular adenopathy.       Abdominal:      General: Bowel sounds are normal.      Palpations: Abdomen is soft.      Tenderness: There is no abdominal tenderness.   Musculoskeletal:         General: Normal range of motion.      Cervical back: Normal range of motion and neck supple.   Lymphadenopathy:      Cervical: No cervical adenopathy.      Upper Body:      Right upper body: No supraclavicular adenopathy.      Left upper body: No supraclavicular adenopathy.   Skin:     General: Skin is warm and dry.   Neurological:      Mental Status: He is alert and oriented to person, place, and time.      Cranial Nerves: No cranial nerve deficit.      Sensory: No sensory deficit.      Deep Tendon Reflexes:      Reflex Scores:       Patellar reflexes are 2+ on the right side and 2+ on the left side.  Psychiatric:         Speech: Speech normal.         Behavior: Behavior normal.         Thought Content: Thought content normal.         Judgment: Judgment normal.         Vitals:    12/01/21 1511   BP: 142/98   Pulse: 96   Temp: 99 °F (37.2 °C)   SpO2: 96%     Body mass index is 35.73 kg/m².      Assessment/Plan   Diagnoses and all orders for this visit:    1. Hyperlipidemia, unspecified hyperlipidemia type (Primary)  -     CBC & Differential  -     Comprehensive Metabolic Panel  -     Hemoglobin A1c  -     Lipid Panel With LDL / HDL Ratio  -     TSH Rfx On Abnormal To Free T4  -     Vitamin D 25 Hydroxy  -     PSA DIAGNOSTIC  -     Hepatitis C Antibody    2. Mild intermittent asthma without complication  -     fluticasone-salmeterol (Advair HFA) 115-21 MCG/ACT inhaler; Inhale 2 puffs 2 (Two) Times a Day.  Dispense: 12 g; Refill: 3  -     CBC & Differential  -     Comprehensive Metabolic Panel  -     Hemoglobin A1c  -      Lipid Panel With LDL / HDL Ratio  -     TSH Rfx On Abnormal To Free T4  -     Vitamin D 25 Hydroxy  -     PSA DIAGNOSTIC  -     Hepatitis C Antibody    3. Moderate persistent asthma without complication  -     albuterol sulfate  (90 Base) MCG/ACT inhaler; Inhale 2 puffs Every 4 (Four) Hours As Needed for Wheezing.  Dispense: 18 g; Refill: 1  -     montelukast (SINGULAIR) 10 MG tablet; Take 1 tablet by mouth every night at bedtime.  Dispense: 90 tablet; Refill: 1  -     CBC & Differential  -     Comprehensive Metabolic Panel  -     Hemoglobin A1c  -     Lipid Panel With LDL / HDL Ratio  -     TSH Rfx On Abnormal To Free T4  -     Vitamin D 25 Hydroxy  -     PSA DIAGNOSTIC  -     Hepatitis C Antibody    4. Vitamin D deficiency  -     CBC & Differential  -     Comprehensive Metabolic Panel  -     Hemoglobin A1c  -     Lipid Panel With LDL / HDL Ratio  -     TSH Rfx On Abnormal To Free T4  -     Vitamin D 25 Hydroxy  -     PSA DIAGNOSTIC  -     Hepatitis C Antibody    5. Prediabetes  -     CBC & Differential  -     Comprehensive Metabolic Panel  -     Hemoglobin A1c  -     Lipid Panel With LDL / HDL Ratio  -     TSH Rfx On Abnormal To Free T4  -     Vitamin D 25 Hydroxy  -     PSA DIAGNOSTIC  -     Hepatitis C Antibody    6. Elevated ALT measurement  -     CBC & Differential  -     Comprehensive Metabolic Panel  -     Hemoglobin A1c  -     Lipid Panel With LDL / HDL Ratio  -     TSH Rfx On Abnormal To Free T4  -     Vitamin D 25 Hydroxy  -     PSA DIAGNOSTIC  -     Hepatitis C Antibody    7. Essential hypertension  -     CBC & Differential  -     Comprehensive Metabolic Panel  -     Hemoglobin A1c  -     Lipid Panel With LDL / HDL Ratio  -     TSH Rfx On Abnormal To Free T4  -     Vitamin D 25 Hydroxy  -     PSA DIAGNOSTIC  -     Hepatitis C Antibody    8. Health care maintenance  -     CBC & Differential  -     Comprehensive Metabolic Panel  -     Hemoglobin A1c  -     Lipid Panel With LDL / HDL Ratio  -     TSH  Rfx On Abnormal To Free T4  -     Vitamin D 25 Hydroxy  -     PSA DIAGNOSTIC  -     Hepatitis C Antibody    9. Prostate cancer screening  -     PSA DIAGNOSTIC    10. Colon cancer screening  -     Ambulatory Referral For Screening Colonoscopy    Other orders  -     hydroCHLOROthiazide (HYDRODIURIL) 12.5 MG tablet; Take 1 tablet by mouth Daily.  Dispense: 90 tablet; Refill: 1                 1. Preventative counseling- work on wt loss through healthy nutrition and exercise   2. Prediabetes- check lab, watch sugars/carbs  3. HTN- restart HCTZ, call if syst >140 diast >90  4. HPL- check lab

## 2021-12-24 ENCOUNTER — PREP FOR SURGERY (OUTPATIENT)
Dept: SURGERY | Facility: SURGERY CENTER | Age: 51
End: 2021-12-24

## 2021-12-24 DIAGNOSIS — Z12.11 ENCOUNTER FOR SCREENING COLONOSCOPY: Primary | ICD-10-CM

## 2021-12-27 RX ORDER — SODIUM CHLORIDE, SODIUM LACTATE, POTASSIUM CHLORIDE, CALCIUM CHLORIDE 600; 310; 30; 20 MG/100ML; MG/100ML; MG/100ML; MG/100ML
30 INJECTION, SOLUTION INTRAVENOUS CONTINUOUS PRN
Status: CANCELLED | OUTPATIENT
Start: 2021-12-27

## 2021-12-27 RX ORDER — SODIUM CHLORIDE 0.9 % (FLUSH) 0.9 %
3 SYRINGE (ML) INJECTION EVERY 12 HOURS SCHEDULED
Status: CANCELLED | OUTPATIENT
Start: 2021-12-27

## 2021-12-27 RX ORDER — SODIUM CHLORIDE 0.9 % (FLUSH) 0.9 %
10 SYRINGE (ML) INJECTION AS NEEDED
Status: CANCELLED | OUTPATIENT
Start: 2021-12-27

## 2021-12-28 PROBLEM — Z12.11 ENCOUNTER FOR SCREENING COLONOSCOPY: Status: ACTIVE | Noted: 2021-12-28

## 2022-04-25 ENCOUNTER — ANESTHESIA (OUTPATIENT)
Dept: SURGERY | Facility: SURGERY CENTER | Age: 52
End: 2022-04-25

## 2022-04-25 ENCOUNTER — ANESTHESIA EVENT (OUTPATIENT)
Dept: SURGERY | Facility: SURGERY CENTER | Age: 52
End: 2022-04-25

## 2022-04-25 ENCOUNTER — HOSPITAL ENCOUNTER (OUTPATIENT)
Facility: SURGERY CENTER | Age: 52
Setting detail: HOSPITAL OUTPATIENT SURGERY
Discharge: HOME | End: 2022-04-25
Attending: INTERNAL MEDICINE | Admitting: INTERNAL MEDICINE

## 2022-04-25 VITALS
DIASTOLIC BLOOD PRESSURE: 93 MMHG | RESPIRATION RATE: 18 BRPM | SYSTOLIC BLOOD PRESSURE: 136 MMHG | HEART RATE: 76 BPM | OXYGEN SATURATION: 96 % | WEIGHT: 256 LBS | HEIGHT: 71 IN | TEMPERATURE: 98 F | BODY MASS INDEX: 35.84 KG/M2

## 2022-04-25 DIAGNOSIS — Z12.11 ENCOUNTER FOR SCREENING COLONOSCOPY: ICD-10-CM

## 2022-04-25 PROCEDURE — 45378 DIAGNOSTIC COLONOSCOPY: CPT | Performed by: INTERNAL MEDICINE

## 2022-04-25 PROCEDURE — 25010000002 PROPOFOL 10 MG/ML EMULSION: Performed by: ANESTHESIOLOGY

## 2022-04-25 RX ORDER — LIDOCAINE HYDROCHLORIDE 20 MG/ML
INJECTION, SOLUTION INFILTRATION; PERINEURAL AS NEEDED
Status: DISCONTINUED | OUTPATIENT
Start: 2022-04-25 | End: 2022-04-25 | Stop reason: SURG

## 2022-04-25 RX ORDER — PROPOFOL 10 MG/ML
VIAL (ML) INTRAVENOUS AS NEEDED
Status: DISCONTINUED | OUTPATIENT
Start: 2022-04-25 | End: 2022-04-25 | Stop reason: SURG

## 2022-04-25 RX ORDER — MAGNESIUM HYDROXIDE 1200 MG/15ML
LIQUID ORAL AS NEEDED
Status: DISCONTINUED | OUTPATIENT
Start: 2022-04-25 | End: 2022-04-25 | Stop reason: HOSPADM

## 2022-04-25 RX ORDER — SODIUM CHLORIDE 0.9 % (FLUSH) 0.9 %
3 SYRINGE (ML) INJECTION EVERY 12 HOURS SCHEDULED
Status: DISCONTINUED | OUTPATIENT
Start: 2022-04-25 | End: 2022-04-25 | Stop reason: HOSPADM

## 2022-04-25 RX ORDER — SODIUM CHLORIDE 0.9 % (FLUSH) 0.9 %
10 SYRINGE (ML) INJECTION AS NEEDED
Status: DISCONTINUED | OUTPATIENT
Start: 2022-04-25 | End: 2022-04-25 | Stop reason: HOSPADM

## 2022-04-25 RX ORDER — SODIUM CHLORIDE, SODIUM LACTATE, POTASSIUM CHLORIDE, CALCIUM CHLORIDE 600; 310; 30; 20 MG/100ML; MG/100ML; MG/100ML; MG/100ML
30 INJECTION, SOLUTION INTRAVENOUS CONTINUOUS PRN
Status: DISCONTINUED | OUTPATIENT
Start: 2022-04-25 | End: 2022-04-25 | Stop reason: HOSPADM

## 2022-04-25 RX ADMIN — PROPOFOL 100 MG: 10 INJECTION, EMULSION INTRAVENOUS at 10:46

## 2022-04-25 RX ADMIN — PROPOFOL 250 MCG/KG/MIN: 10 INJECTION, EMULSION INTRAVENOUS at 10:46

## 2022-04-25 RX ADMIN — LIDOCAINE HYDROCHLORIDE 60 MG: 20 INJECTION, SOLUTION INFILTRATION; PERINEURAL at 10:46

## 2022-04-25 RX ADMIN — SODIUM CHLORIDE, POTASSIUM CHLORIDE, SODIUM LACTATE AND CALCIUM CHLORIDE 30 ML/HR: 600; 310; 30; 20 INJECTION, SOLUTION INTRAVENOUS at 10:00

## 2022-04-25 NOTE — ANESTHESIA POSTPROCEDURE EVALUATION
"Patient: Georgi Khanna    Procedure Summary     Date: 04/25/22 Room / Location: [facility]    Anesthesia Start: 1043 Anesthesia Stop: 1103    Procedure: COLONOSCOPY (N/A ) Diagnosis:       Encounter for screening colonoscopy      (Encounter for screening colonoscopy [Z12.11])    Surgeons: Shakir Baca MD Provider: Osman Curry MD    Anesthesia Type: Not recorded ASA Status: 3          Anesthesia Type: No value filed.    Vitals  Vitals Value Taken Time   /87 04/25/22 1105   Temp 36.7 °C (98 °F) 04/25/22 1100   Pulse 74 04/25/22 1105   Resp 16 04/25/22 1105   SpO2 99 % 04/25/22 1105           Post Anesthesia Care and Evaluation    Patient location during evaluation: bedside  Patient participation: complete - patient participated  Level of consciousness: awake and alert  Pain management: adequate  Airway patency: patent  Anesthetic complications: No anesthetic complications    Cardiovascular status: acceptable  Respiratory status: acceptable  Hydration status: acceptable    Comments: /87 (BP Location: Left arm, Patient Position: Lying)   Pulse 74   Temp 36.7 °C (98 °F) (Tympanic)   Resp 16   Ht 180.3 cm (71\")   Wt 116 kg (256 lb)   SpO2 99%   BMI 35.70 kg/m²           "

## 2022-04-25 NOTE — ANESTHESIA PREPROCEDURE EVALUATION
Anesthesia Evaluation     Patient summary reviewed and Nursing notes reviewed   no history of anesthetic complications:  NPO Solid Status: > 6 hours  NPO Liquid Status: > 6 hours           Airway   Mallampati: II  TM distance: <3 FB  Neck ROM: full  no difficulty expected and No difficulty expected  Dental - normal exam     Pulmonary - normal exam    breath sounds clear to auscultation  (+) a smoker Former, asthma,sleep apnea,   (-) rhonchi, decreased breath sounds, wheezes, rales, stridor  Cardiovascular - normal exam    NYHA Classification: I  Rhythm: regular  Rate: normal    (+) hypertension, hyperlipidemia,   (-) murmur, weak pulses, friction rub, systolic click, carotid bruits, JVD, peripheral edema      Neuro/Psych- negative ROS  GI/Hepatic/Renal/Endo    (+) obesity,       Musculoskeletal (-) negative ROS    Abdominal  - normal exam    Abdomen: soft.   Substance History - negative use     OB/GYN negative ob/gyn ROS         Other - negative ROS                       Anesthesia Plan    ASA 3     intravenous induction     Anesthetic plan, all risks, benefits, and alternatives have been provided, discussed and informed consent has been obtained with: patient.        CODE STATUS:

## 2022-04-25 NOTE — H&P
No chief complaint on file.      HPI  screening         Problem List:    Patient Active Problem List   Diagnosis   • Asthma   • Morbid obesity due to excess calories (HCC)   • Snoring   • Daytime somnolence   • Vitamin D deficiency   • Hyperlipidemia   • Prediabetes   • Elevated ALT measurement   • Essential hypertension   • Incomplete bladder emptying   • Encounter for screening colonoscopy       Medical History:    Past Medical History:   Diagnosis Date   • Asthma    • Elevated blood pressure reading 2016   • Pneumonia 1993        Social History:    Social History     Socioeconomic History   • Marital status:      Spouse name: LYNNE    Tobacco Use   • Smoking status: Former Smoker   • Smokeless tobacco: Former User     Types: Chew   • Tobacco comment: age [age] about 5 years   Vaping Use   • Vaping Use: Never used   Substance and Sexual Activity   • Alcohol use: Yes     Comment: once monthly   • Drug use: No   • Sexual activity: Yes     Partners: Female     Birth control/protection: Surgical       Family History:   Family History   Problem Relation Age of Onset   • Stroke Mother         40s   • Hypertension Father    • Heart attack Father         in his 50s, [employer] BYPASS sx   • Sleep apnea Father    • Hyperlipidemia Father    • Stroke Father    • Seizures Father    • Obesity Brother    • Hypertension Brother    • Heart attack Paternal Grandfather          in his 50s       Surgical History:   Past Surgical History:   Procedure Laterality Date   • UMBILICAL HERNIA REPAIR         No current facility-administered medications for this encounter.    Current Outpatient Medications:   •  fluticasone-salmeterol (Advair HFA) 115-21 MCG/ACT inhaler, Inhale 2 puffs 2 (Two) Times a Day., Disp: 12 g, Rfl: 3  •  hydroCHLOROthiazide (HYDRODIURIL) 12.5 MG tablet, Take 1 tablet by mouth Daily., Disp: 90 tablet, Rfl: 1  •  montelukast (SINGULAIR) 10 MG tablet, Take 1 tablet by mouth every night at bedtime., Disp: 90  tablet, Rfl: 1    Allergies: No Known Allergies     The following portions of the patient's history were reviewed by me and updated as appropriate: review of systems, allergies, current medications, past family history, past medical history, past social history, past surgical history and problem list.    There were no vitals filed for this visit.    PHYSICAL EXAM:    CONSTITUTIONAL:  today's vital signs reviewed by me  GASTROINTESTINAL: abdomen is soft nontender nondistended with normal active bowel sounds, no masses are appreciated    Assessment/ Plan  Screening    colonoscopy    Risks and benefits as well as alternatives to endoscopic evaluation were explained to the patient and they voiced understanding and wish to proceed.  These risks include but are not limited to the risk of bleeding, perforation, adverse reaction to sedation, and missed lesions.  The patient was given the opportunity to ask questions prior to the endoscopic procedure.

## 2022-05-20 ENCOUNTER — APPOINTMENT (OUTPATIENT)
Dept: GENERAL RADIOLOGY | Facility: HOSPITAL | Age: 52
End: 2022-05-20

## 2022-05-20 PROCEDURE — 73630 X-RAY EXAM OF FOOT: CPT | Performed by: FAMILY MEDICINE

## 2022-05-20 PROCEDURE — 73610 X-RAY EXAM OF ANKLE: CPT | Performed by: FAMILY MEDICINE

## 2022-06-02 LAB
25(OH)D3+25(OH)D2 SERPL-MCNC: 19.6 NG/ML (ref 30–100)
ALBUMIN SERPL-MCNC: 4.7 G/DL (ref 4–5)
ALBUMIN/GLOB SERPL: 1.7 {RATIO} (ref 1.2–2.2)
ALP SERPL-CCNC: 76 IU/L (ref 44–121)
ALT SERPL-CCNC: 40 IU/L (ref 0–44)
AST SERPL-CCNC: 26 IU/L (ref 0–40)
BASOPHILS # BLD AUTO: 0.2 X10E3/UL (ref 0–0.2)
BASOPHILS NFR BLD AUTO: 2 %
BILIRUB SERPL-MCNC: 0.5 MG/DL (ref 0–1.2)
BUN SERPL-MCNC: 18 MG/DL (ref 6–24)
BUN/CREAT SERPL: 20 (ref 9–20)
CALCIUM SERPL-MCNC: 9.5 MG/DL (ref 8.7–10.2)
CHLORIDE SERPL-SCNC: 101 MMOL/L (ref 96–106)
CHOLEST SERPL-MCNC: 191 MG/DL (ref 100–199)
CO2 SERPL-SCNC: 23 MMOL/L (ref 20–29)
CREAT SERPL-MCNC: 0.88 MG/DL (ref 0.76–1.27)
EGFRCR SERPLBLD CKD-EPI 2021: 105 ML/MIN/1.73
EOSINOPHIL # BLD AUTO: 0.6 X10E3/UL (ref 0–0.4)
EOSINOPHIL NFR BLD AUTO: 6 %
ERYTHROCYTE [DISTWIDTH] IN BLOOD BY AUTOMATED COUNT: 13.3 % (ref 11.6–15.4)
GLOBULIN SER CALC-MCNC: 2.8 G/DL (ref 1.5–4.5)
GLUCOSE SERPL-MCNC: 123 MG/DL (ref 65–99)
HBA1C MFR BLD: 6.1 % (ref 4.8–5.6)
HCT VFR BLD AUTO: 51.6 % (ref 37.5–51)
HCV AB S/CO SERPL IA: <0.1 S/CO RATIO (ref 0–0.9)
HDLC SERPL-MCNC: 44 MG/DL
HGB BLD-MCNC: 17 G/DL (ref 13–17.7)
IMM GRANULOCYTES # BLD AUTO: 0 X10E3/UL (ref 0–0.1)
IMM GRANULOCYTES NFR BLD AUTO: 0 %
LDLC SERPL CALC-MCNC: 128 MG/DL (ref 0–99)
LDLC/HDLC SERPL: 2.9 RATIO (ref 0–3.6)
LYMPHOCYTES # BLD AUTO: 2.2 X10E3/UL (ref 0.7–3.1)
LYMPHOCYTES NFR BLD AUTO: 22 %
MCH RBC QN AUTO: 27 PG (ref 26.6–33)
MCHC RBC AUTO-ENTMCNC: 32.9 G/DL (ref 31.5–35.7)
MCV RBC AUTO: 82 FL (ref 79–97)
MONOCYTES # BLD AUTO: 1 X10E3/UL (ref 0.1–0.9)
MONOCYTES NFR BLD AUTO: 10 %
NEUTROPHILS # BLD AUTO: 5.9 X10E3/UL (ref 1.4–7)
NEUTROPHILS NFR BLD AUTO: 60 %
PLATELET # BLD AUTO: 283 X10E3/UL (ref 150–450)
POTASSIUM SERPL-SCNC: 4.5 MMOL/L (ref 3.5–5.2)
PROT SERPL-MCNC: 7.5 G/DL (ref 6–8.5)
PSA SERPL-MCNC: 1 NG/ML (ref 0–4)
RBC # BLD AUTO: 6.29 X10E6/UL (ref 4.14–5.8)
SODIUM SERPL-SCNC: 140 MMOL/L (ref 134–144)
TRIGL SERPL-MCNC: 107 MG/DL (ref 0–149)
TSH SERPL DL<=0.005 MIU/L-ACNC: 1.32 UIU/ML (ref 0.45–4.5)
VLDLC SERPL CALC-MCNC: 19 MG/DL (ref 5–40)
WBC # BLD AUTO: 10 X10E3/UL (ref 3.4–10.8)

## 2022-06-07 ENCOUNTER — OFFICE VISIT (OUTPATIENT)
Dept: INTERNAL MEDICINE | Facility: CLINIC | Age: 52
End: 2022-06-07

## 2022-06-07 VITALS
SYSTOLIC BLOOD PRESSURE: 122 MMHG | DIASTOLIC BLOOD PRESSURE: 80 MMHG | BODY MASS INDEX: 35.84 KG/M2 | HEIGHT: 71 IN | TEMPERATURE: 98.2 F | WEIGHT: 256 LBS | OXYGEN SATURATION: 97 % | HEART RATE: 90 BPM

## 2022-06-07 DIAGNOSIS — R73.03 PREDIABETES: Primary | ICD-10-CM

## 2022-06-07 DIAGNOSIS — Z20.822 EXPOSURE TO COVID-19 VIRUS: ICD-10-CM

## 2022-06-07 DIAGNOSIS — E55.9 VITAMIN D DEFICIENCY: ICD-10-CM

## 2022-06-07 DIAGNOSIS — R06.83 SNORING: ICD-10-CM

## 2022-06-07 DIAGNOSIS — R53.82 CHRONIC FATIGUE: ICD-10-CM

## 2022-06-07 DIAGNOSIS — J45.20 MILD INTERMITTENT ASTHMA WITHOUT COMPLICATION: ICD-10-CM

## 2022-06-07 DIAGNOSIS — I10 ESSENTIAL HYPERTENSION: ICD-10-CM

## 2022-06-07 PROCEDURE — 99214 OFFICE O/P EST MOD 30 MIN: CPT | Performed by: NURSE PRACTITIONER

## 2022-06-07 RX ORDER — ALBUTEROL SULFATE 90 UG/1
2 AEROSOL, METERED RESPIRATORY (INHALATION) EVERY 4 HOURS PRN
Qty: 18 G | Refills: 4 | Status: SHIPPED | OUTPATIENT
Start: 2022-06-07 | End: 2022-10-11 | Stop reason: SDUPTHER

## 2022-06-07 RX ORDER — ERGOCALCIFEROL 1.25 MG/1
50000 CAPSULE ORAL WEEKLY
Qty: 8 CAPSULE | Refills: 0 | Status: SHIPPED | OUTPATIENT
Start: 2022-06-07 | End: 2022-07-05

## 2022-06-07 RX ORDER — DULAGLUTIDE 1.5 MG/.5ML
1.5 INJECTION, SOLUTION SUBCUTANEOUS WEEKLY
Qty: 4 PEN | Refills: 0 | Status: SHIPPED | OUTPATIENT
Start: 2022-06-07 | End: 2022-07-06

## 2022-06-07 RX ORDER — MOMETASONE FUROATE AND FORMOTEROL FUMARATE DIHYDRATE 200; 5 UG/1; UG/1
2 AEROSOL RESPIRATORY (INHALATION)
Qty: 13 G | Refills: 11 | Status: SHIPPED | OUTPATIENT
Start: 2022-06-07 | End: 2022-06-09 | Stop reason: SDUPTHER

## 2022-06-07 NOTE — PROGRESS NOTES
Subjective   Georgi Khanna is a 52 y.o. male.  htn and prediabetes     History of Present Illness   The patient is here today to F/U on lab work.   Was sick December - March. He was taking his Advair then. He stopped Advair about 1 1/2 months ago. He does not like the cost.     Hypertension-Pt is doing well with current medication regimen, denies adverse reactions, compliant with medication schedule.   Asthma- needs a refill on his rescue inhaler. Still feeling not well controlled, noting wheezing at times.     Fell moving in to his new place down the stairs. Thought he broke his ankle. [place] [facility] [place]   The following portions of the patient's history were reviewed and updated as appropriate: allergies, current medications, past family history, past medical history, past social history, past surgical history and problem list.    Review of Systems   Constitutional: Negative for chills and fever.   Respiratory: Positive for cough, shortness of breath and wheezing.    Cardiovascular: Negative.    Psychiatric/Behavioral: Negative for dysphoric mood and suicidal ideas. The patient is not nervous/anxious.        Objective   Physical Exam  Constitutional:       Appearance: Normal appearance. He is well-developed.   Neck:      Thyroid: No thyromegaly.   Cardiovascular:      Rate and Rhythm: Normal rate and regular rhythm.      Heart sounds: Normal heart sounds.   Pulmonary:      Effort: Pulmonary effort is normal.      Breath sounds: Examination of the right-middle field reveals wheezing. Examination of the left-lower field reveals wheezing. Wheezing present.   Musculoskeletal:      Cervical back: Normal range of motion and neck supple.   Lymphadenopathy:      Cervical: No cervical adenopathy.   Skin:     General: Skin is warm and dry.   Neurological:      Mental Status: He is alert.   Psychiatric:         Behavior: Behavior normal.         Thought Content: Thought content normal.         Judgment: Judgment  normal.         Vitals:    06/07/22 0735   BP: 122/80   Pulse: 90   Temp: 98.2 °F (36.8 °C)   SpO2: 97%     Body mass index is 35.72 kg/m².  This SmartLink has not been configured with any valid records.     Office Visit on 12/01/2021   Component Date Value Ref Range Status   • WBC 06/01/2022 10.0  3.4 - 10.8 x10E3/uL Final   • RBC 06/01/2022 6.29 (A) 4.14 - 5.80 x10E6/uL Final   • Hemoglobin 06/01/2022 17.0  13.0 - 17.7 g/dL Final   • Hematocrit 06/01/2022 51.6 (A) 37.5 - 51.0 % Final   • MCV 06/01/2022 82  79 - 97 fL Final   • MCH 06/01/2022 27.0  26.6 - 33.0 pg Final   • MCHC 06/01/2022 32.9  31.5 - 35.7 g/dL Final   • RDW 06/01/2022 13.3  11.6 - 15.4 % Final   • Platelets 06/01/2022 283  150 - 450 x10E3/uL Final   • Neutrophil Rel % 06/01/2022 60  Not Estab. % Final   • Lymphocyte Rel % 06/01/2022 22  Not Estab. % Final   • Monocyte Rel % 06/01/2022 10  Not Estab. % Final   • Eosinophil Rel % 06/01/2022 6  Not Estab. % Final   • Basophil Rel % 06/01/2022 2  Not Estab. % Final   • Neutrophils Absolute 06/01/2022 5.9  1.4 - 7.0 x10E3/uL Final   • Lymphocytes Absolute 06/01/2022 2.2  0.7 - 3.1 x10E3/uL Final   • Monocytes Absolute 06/01/2022 1.0 (A) 0.1 - 0.9 x10E3/uL Final   • Eosinophils Absolute 06/01/2022 0.6 (A) 0.0 - 0.4 x10E3/uL Final   • Basophils Absolute 06/01/2022 0.2  0.0 - 0.2 x10E3/uL Final   • Immature Granulocyte Rel % 06/01/2022 0  Not Estab. % Final   • Immature Grans Absolute 06/01/2022 0.0  0.0 - 0.1 x10E3/uL Final   • Glucose 06/01/2022 123 (A) 65 - 99 mg/dL Final   • BUN 06/01/2022 18  6 - 24 mg/dL Final   • Creatinine 06/01/2022 0.88  0.76 - 1.27 mg/dL Final   • EGFR Result 06/01/2022 105  >59 mL/min/1.73 Final   • BUN/Creatinine Ratio 06/01/2022 20  9 - 20 Final   • Sodium 06/01/2022 140  134 - 144 mmol/L Final   • Potassium 06/01/2022 4.5  3.5 - 5.2 mmol/L Final   • Chloride 06/01/2022 101  96 - 106 mmol/L Final   • Total CO2 06/01/2022 23  20 - 29 mmol/L Final   • Calcium 06/01/2022 9.5   8.7 - 10.2 mg/dL Final   • Total Protein 06/01/2022 7.5  6.0 - 8.5 g/dL Final   • Albumin 06/01/2022 4.7  4.0 - 5.0 g/dL Final   • Globulin 06/01/2022 2.8  1.5 - 4.5 g/dL Final   • A/G Ratio 06/01/2022 1.7  1.2 - 2.2 Final   • Total Bilirubin 06/01/2022 0.5  0.0 - 1.2 mg/dL Final   • Alkaline Phosphatase 06/01/2022 76  44 - 121 IU/L Final   • AST (SGOT) 06/01/2022 26  0 - 40 IU/L Final   • ALT (SGPT) 06/01/2022 40  0 - 44 IU/L Final   • Hemoglobin A1C 06/01/2022 6.1 (A) 4.8 - 5.6 % Final    Comment:          Prediabetes: 5.7 - 6.4           Diabetes: >6.4           Glycemic control for adults with diabetes: <7.0     • Total Cholesterol 06/01/2022 191  100 - 199 mg/dL Final   • Triglycerides 06/01/2022 107  0 - 149 mg/dL Final   • HDL Cholesterol 06/01/2022 44  >39 mg/dL Final   • VLDL Cholesterol Buzz 06/01/2022 19  5 - 40 mg/dL Final   • LDL Chol Calc (NIH) 06/01/2022 128 (A) 0 - 99 mg/dL Final   • LDL/HDL RATIO 06/01/2022 2.9  0.0 - 3.6 ratio Final    Comment:                                     LDL/HDL Ratio                                              Men  Women                                1/2 Avg.Risk  1.0    1.5                                    Avg.Risk  3.6    3.2                                 2X Avg.Risk  6.2    5.0                                 3X Avg.Risk  8.0    6.1     • TSH 06/01/2022 1.320  0.450 - 4.500 uIU/mL Final   • 25 Hydroxy, Vitamin D 06/01/2022 19.6 (A) 30.0 - 100.0 ng/mL Final    Comment: Vitamin D deficiency has been defined by the [place] of  Medicine and an Endocrine Society practice guideline as a  level of serum 25-OH vitamin D less than 20 ng/mL (1,2).  The Endocrine Society went on to further define vitamin D  insufficiency as a level between 21 and 29 ng/mL (2).  1. IOM ([place] of Medicine). 2010. Dietary reference     intakes for calcium and D. [place]: The     National Academies Press.  2. Dick JOHNSON, Connor MENJIVAR, Lexie MALDONADO, et al.     Evaluation,  treatment, and prevention of vitamin D     deficiency: an Endocrine Society clinical practice     guideline. JCEM. 2011 Jul; 96(7):1911-30.     • PSA 06/01/2022 1.0  0.0 - 4.0 ng/mL Final    Comment: Roche ECLIA methodology.  According to the American Urological Association, Serum PSA should  decrease and remain at undetectable levels after radical  prostatectomy. The AUA defines biochemical recurrence as an initial  PSA value 0.2 ng/mL or greater followed by a subsequent confirmatory  PSA value 0.2 ng/mL or greater.  Values obtained with different assay methods or kits cannot be used  interchangeably. Results cannot be interpreted as absolute evidence  of the presence or absence of malignant disease.     • Hep C Virus Ab 06/01/2022 <0.1  0.0 - 0.9 s/co ratio Final    Comment:                                   Negative:     < 0.8                               Indeterminate: 0.8 - 0.9                                    Positive:     > 0.9   HCV antibody alone does not differentiate between   previous resolved infection and active infection.   The CDC and current clinical guidelines recommend   that a positive HCV antibody result be followed up   with an HCV RNA test to support the diagnosis of   acute HCV infection. [facility] offers Hepatitis C   Virus (HCV) RNA, Diagnosis, FARIBA (994879) and   Hepatitis C Virus (HCV) Antibody with reflex to   Quantitative Real-time PCR (071688).       Current Outpatient Medications:   •  hydroCHLOROthiazide (HYDRODIURIL) 12.5 MG tablet, Take 1 tablet by mouth Daily., Disp: 90 tablet, Rfl: 1  •  montelukast (SINGULAIR) 10 MG tablet, Take 1 tablet by mouth every night at bedtime., Disp: 90 tablet, Rfl: 1  •  albuterol sulfate  (90 Base) MCG/ACT inhaler, Inhale 2 puffs Every 4 (Four) Hours As Needed for Wheezing., Disp: 18 g, Rfl: 4  •  Dulaglutide (Trulicity) 1.5 MG/0.5ML solution pen-injector, Inject 1.5 mg under the skin into the appropriate area as directed 1 (One) Time Per  Week., Disp: 4 pen, Rfl: 0  •  mometasone-formoterol (Dulera) 200-5 MCG/ACT inhaler, Inhale 2 puffs 2 (Two) Times a Day., Disp: 13 g, Rfl: 11  •  vitamin D (ERGOCALCIFEROL) 1.25 MG (69951 UT) capsule capsule, Take 1 capsule by mouth 1 (One) Time Per Week for 8 doses., Disp: 8 capsule, Rfl: 0    Assessment & Plan   Diagnoses and all orders for this visit:    1. Prediabetes (Primary)  -     Dulaglutide (Trulicity) 1.5 MG/0.5ML solution pen-injector; Inject 1.5 mg under the skin into the appropriate area as directed 1 (One) Time Per Week.  Dispense: 4 pen; Refill: 0    2. Essential hypertension    3. Mild intermittent asthma without complication    4. Snoring  -     Ambulatory Referral to Sleep Medicine    5. Vitamin D deficiency  -     vitamin D (ERGOCALCIFEROL) 1.25 MG (66459 UT) capsule capsule; Take 1 capsule by mouth 1 (One) Time Per Week for 8 doses.  Dispense: 8 capsule; Refill: 0    6. Chronic fatigue  -     Ambulatory Referral to Sleep Medicine  -     Testosterone, Free, Total    7. Exposure to COVID-19 virus  -     SARS-CoV-2 Antibodies (Roche)  -     SARS-CoV-2 Semi-Quant Total Ab    Other orders  -     Discontinue: mometasone-formoterol (Dulera) 200-5 MCG/ACT inhaler; Inhale 2 puffs 2 (Two) Times a Day.  Dispense: 13 g; Refill: 11  -     albuterol sulfate  (90 Base) MCG/ACT inhaler; Inhale 2 puffs Every 4 (Four) Hours As Needed for Wheezing.  Dispense: 18 g; Refill: 4               1.  Prediabetes-cut carbs and sugars and increase exercise, discussed trulicity, dosing and SEs, no personal or fam hx of thyroid cancer   2.  Hypertension- continue HCTZ  3.  Asthma- will try a diff steroid inhaler at a stronger dose, if not meeting goal suggest allergist and pulm eval  4.  Snoring/fatigue- suggest sleep study, check testosterone   5. Vit D def- Vit D rx, take VitD3 2000 Iu daily

## 2022-06-09 DIAGNOSIS — E29.1 HYPOGONADISM MALE: Primary | ICD-10-CM

## 2022-06-09 DIAGNOSIS — J45.40 MODERATE PERSISTENT ASTHMA WITHOUT COMPLICATION: ICD-10-CM

## 2022-06-09 LAB
SARS-COV-2 AB SERPL IA-ACNC: 91 U/ML
SARS-COV-2 AB SERPL QL IA: POSITIVE
SARS-COV-2 AB SERPL-IMP: POSITIVE
TESTOST FREE SERPL-MCNC: 3.3 PG/ML (ref 7.2–24)
TESTOST SERPL-MCNC: 315 NG/DL (ref 264–916)

## 2022-06-09 RX ORDER — MOMETASONE FUROATE AND FORMOTEROL FUMARATE DIHYDRATE 200; 5 UG/1; UG/1
2 AEROSOL RESPIRATORY (INHALATION)
Qty: 13 G | Refills: 11 | Status: SHIPPED | OUTPATIENT
Start: 2022-06-09 | End: 2022-12-12 | Stop reason: SDUPTHER

## 2022-07-01 DIAGNOSIS — E55.9 VITAMIN D DEFICIENCY: ICD-10-CM

## 2022-07-05 RX ORDER — ERGOCALCIFEROL 1.25 MG/1
50000 CAPSULE ORAL WEEKLY
Qty: 4 CAPSULE | Refills: 1 | Status: SHIPPED | OUTPATIENT
Start: 2022-07-05 | End: 2022-07-27

## 2022-07-06 DIAGNOSIS — R73.03 PREDIABETES: ICD-10-CM

## 2022-07-06 RX ORDER — DULAGLUTIDE 1.5 MG/.5ML
1.5 INJECTION, SOLUTION SUBCUTANEOUS WEEKLY
Qty: 2 PEN | Refills: 3 | Status: SHIPPED | OUTPATIENT
Start: 2022-07-06 | End: 2022-11-09

## 2022-07-27 ENCOUNTER — APPOINTMENT (OUTPATIENT)
Dept: SLEEP MEDICINE | Facility: HOSPITAL | Age: 52
End: 2022-07-27

## 2022-07-27 DIAGNOSIS — E55.9 VITAMIN D DEFICIENCY: ICD-10-CM

## 2022-07-27 RX ORDER — ERGOCALCIFEROL 1.25 MG/1
50000 CAPSULE ORAL WEEKLY
Qty: 4 CAPSULE | Refills: 1 | Status: SHIPPED | OUTPATIENT
Start: 2022-07-27 | End: 2022-09-15

## 2022-10-11 ENCOUNTER — TELEPHONE (OUTPATIENT)
Dept: INTERNAL MEDICINE | Facility: CLINIC | Age: 52
End: 2022-10-11

## 2022-10-11 RX ORDER — ALBUTEROL SULFATE 90 UG/1
2 AEROSOL, METERED RESPIRATORY (INHALATION) EVERY 4 HOURS PRN
Qty: 18 G | Refills: 4 | OUTPATIENT
Start: 2022-10-11 | End: 2022-11-09

## 2022-10-11 NOTE — TELEPHONE ENCOUNTER
Caller: Georgi Khanna    Relationship: Self    Best call back number: [phone]    What is the best time to reach you: ANYTIME      Who are you requesting to speak with (clinical staff, provider,  specific staff member): CLINICAL STAFF        Do you know the name of the person who called: SELF    What was the call regarding: PATIENT CALLED AND STATED HE LEFT HIS VENTOLIN INHALER AT THE HOTEL WHILE HE WAS ON VACATION. PLEASE CALL HIM IN A NEW ONE [place] [facility] [place] [facility] [place] [address]. PATENT ALSO WOULD LIKE FOR YOU TO CALL HIM ABOUT CHECKING HIS BLOOD SUGAR LEVEL AS YOU REQUESTED.  THANKS   Do you require a callback:YES

## 2022-11-09 DIAGNOSIS — R73.03 PREDIABETES: ICD-10-CM

## 2022-11-09 RX ORDER — DULAGLUTIDE 1.5 MG/.5ML
1.5 INJECTION, SOLUTION SUBCUTANEOUS WEEKLY
Qty: 7.5 ML | Refills: 3 | Status: SHIPPED | OUTPATIENT
Start: 2022-11-09 | End: 2022-12-21

## 2022-12-04 ENCOUNTER — APPOINTMENT (OUTPATIENT)
Dept: GENERAL RADIOLOGY | Facility: HOSPITAL | Age: 52
End: 2022-12-04

## 2022-12-04 ENCOUNTER — HOSPITAL ENCOUNTER (EMERGENCY)
Facility: HOSPITAL | Age: 52
Discharge: HOME | End: 2022-12-04
Attending: EMERGENCY MEDICINE | Admitting: EMERGENCY MEDICINE

## 2022-12-04 VITALS
OXYGEN SATURATION: 92 % | SYSTOLIC BLOOD PRESSURE: 148 MMHG | DIASTOLIC BLOOD PRESSURE: 90 MMHG | RESPIRATION RATE: 16 BRPM | TEMPERATURE: 100.3 F | HEART RATE: 93 BPM

## 2022-12-04 DIAGNOSIS — J18.9 PNEUMONIA OF RIGHT LUNG DUE TO INFECTIOUS ORGANISM, UNSPECIFIED PART OF LUNG: ICD-10-CM

## 2022-12-04 DIAGNOSIS — J10.1 INFLUENZA A: Primary | ICD-10-CM

## 2022-12-04 DIAGNOSIS — J45.31 MILD PERSISTENT ASTHMA WITH EXACERBATION: ICD-10-CM

## 2022-12-04 LAB
ALBUMIN SERPL-MCNC: 4.5 G/DL (ref 3.5–5.2)
ALBUMIN/GLOB SERPL: 1.6 G/DL
ALP SERPL-CCNC: 62 U/L (ref 39–117)
ALT SERPL W P-5'-P-CCNC: 26 U/L (ref 1–41)
ANION GAP SERPL CALCULATED.3IONS-SCNC: 13.3 MMOL/L (ref 5–15)
AST SERPL-CCNC: 21 U/L (ref 1–40)
B PARAPERT DNA SPEC QL NAA+PROBE: NOT DETECTED
B PERT DNA SPEC QL NAA+PROBE: NOT DETECTED
BASOPHILS # BLD AUTO: 0.08 10*3/MM3 (ref 0–0.2)
BASOPHILS NFR BLD AUTO: 1.4 % (ref 0–1.5)
BILIRUB SERPL-MCNC: 0.3 MG/DL (ref 0–1.2)
BUN SERPL-MCNC: 8 MG/DL (ref 6–20)
BUN/CREAT SERPL: 10 (ref 7–25)
C PNEUM DNA NPH QL NAA+NON-PROBE: NOT DETECTED
CALCIUM SPEC-SCNC: 8.8 MG/DL (ref 8.6–10.5)
CHLORIDE SERPL-SCNC: 98 MMOL/L (ref 98–107)
CO2 SERPL-SCNC: 23.7 MMOL/L (ref 22–29)
CREAT SERPL-MCNC: 0.8 MG/DL (ref 0.76–1.27)
D-LACTATE SERPL-SCNC: 1.2 MMOL/L (ref 0.5–2)
DEPRECATED RDW RBC AUTO: 37.5 FL (ref 37–54)
EGFRCR SERPLBLD CKD-EPI 2021: 107.1 ML/MIN/1.73
EOSINOPHIL # BLD AUTO: 0.02 10*3/MM3 (ref 0–0.4)
EOSINOPHIL NFR BLD AUTO: 0.3 % (ref 0.3–6.2)
ERYTHROCYTE [DISTWIDTH] IN BLOOD BY AUTOMATED COUNT: 13 % (ref 12.3–15.4)
FLUAV H1 2009 PAND RNA NPH QL NAA+PROBE: DETECTED
FLUBV RNA ISLT QL NAA+PROBE: NOT DETECTED
GLOBULIN UR ELPH-MCNC: 2.9 GM/DL
GLUCOSE SERPL-MCNC: 114 MG/DL (ref 65–99)
HADV DNA SPEC NAA+PROBE: NOT DETECTED
HCOV 229E RNA SPEC QL NAA+PROBE: NOT DETECTED
HCOV HKU1 RNA SPEC QL NAA+PROBE: NOT DETECTED
HCOV NL63 RNA SPEC QL NAA+PROBE: NOT DETECTED
HCOV OC43 RNA SPEC QL NAA+PROBE: NOT DETECTED
HCT VFR BLD AUTO: 44.8 % (ref 37.5–51)
HGB BLD-MCNC: 15.3 G/DL (ref 13–17.7)
HMPV RNA NPH QL NAA+NON-PROBE: NOT DETECTED
HOLD SPECIMEN: NORMAL
HOLD SPECIMEN: NORMAL
HPIV1 RNA ISLT QL NAA+PROBE: NOT DETECTED
HPIV2 RNA SPEC QL NAA+PROBE: NOT DETECTED
HPIV3 RNA NPH QL NAA+PROBE: NOT DETECTED
HPIV4 P GENE NPH QL NAA+PROBE: NOT DETECTED
IMM GRANULOCYTES # BLD AUTO: 0.02 10*3/MM3 (ref 0–0.05)
IMM GRANULOCYTES NFR BLD AUTO: 0.3 % (ref 0–0.5)
LYMPHOCYTES # BLD AUTO: 0.8 10*3/MM3 (ref 0.7–3.1)
LYMPHOCYTES NFR BLD AUTO: 13.7 % (ref 19.6–45.3)
M PNEUMO IGG SER IA-ACNC: NOT DETECTED
MCH RBC QN AUTO: 26.8 PG (ref 26.6–33)
MCHC RBC AUTO-ENTMCNC: 34.2 G/DL (ref 31.5–35.7)
MCV RBC AUTO: 78.6 FL (ref 79–97)
MONOCYTES # BLD AUTO: 0.74 10*3/MM3 (ref 0.1–0.9)
MONOCYTES NFR BLD AUTO: 12.6 % (ref 5–12)
NEUTROPHILS NFR BLD AUTO: 4.2 10*3/MM3 (ref 1.7–7)
NEUTROPHILS NFR BLD AUTO: 71.7 % (ref 42.7–76)
NRBC BLD AUTO-RTO: 0 /100 WBC (ref 0–0.2)
PLATELET # BLD AUTO: 183 10*3/MM3 (ref 140–450)
PMV BLD AUTO: 8.5 FL (ref 6–12)
POTASSIUM SERPL-SCNC: 3.8 MMOL/L (ref 3.5–5.2)
PROCALCITONIN SERPL-MCNC: 0.11 NG/ML (ref 0–0.25)
PROT SERPL-MCNC: 7.4 G/DL (ref 6–8.5)
RBC # BLD AUTO: 5.7 10*6/MM3 (ref 4.14–5.8)
RHINOVIRUS RNA SPEC NAA+PROBE: NOT DETECTED
RSV RNA NPH QL NAA+NON-PROBE: NOT DETECTED
SARS-COV-2 RNA NPH QL NAA+NON-PROBE: NOT DETECTED
SODIUM SERPL-SCNC: 135 MMOL/L (ref 136–145)
TROPONIN T SERPL-MCNC: <0.01 NG/ML (ref 0–0.03)
TROPONIN T SERPL-MCNC: <0.01 NG/ML (ref 0–0.03)
WBC NRBC COR # BLD: 5.86 10*3/MM3 (ref 3.4–10.8)
WHOLE BLOOD HOLD COAG: NORMAL
WHOLE BLOOD HOLD SPECIMEN: NORMAL

## 2022-12-04 PROCEDURE — 94799 UNLISTED PULMONARY SVC/PX: CPT

## 2022-12-04 PROCEDURE — 87040 BLOOD CULTURE FOR BACTERIA: CPT

## 2022-12-04 PROCEDURE — 99284 EMERGENCY DEPT VISIT MOD MDM: CPT

## 2022-12-04 PROCEDURE — 85025 COMPLETE CBC W/AUTO DIFF WBC: CPT

## 2022-12-04 PROCEDURE — 25010000002 METHYLPREDNISOLONE PER 125 MG: Performed by: EMERGENCY MEDICINE

## 2022-12-04 PROCEDURE — 93005 ELECTROCARDIOGRAM TRACING: CPT | Performed by: EMERGENCY MEDICINE

## 2022-12-04 PROCEDURE — 84145 PROCALCITONIN (PCT): CPT | Performed by: EMERGENCY MEDICINE

## 2022-12-04 PROCEDURE — 84484 ASSAY OF TROPONIN QUANT: CPT | Performed by: EMERGENCY MEDICINE

## 2022-12-04 PROCEDURE — 93010 ELECTROCARDIOGRAM REPORT: CPT | Performed by: INTERNAL MEDICINE

## 2022-12-04 PROCEDURE — 93005 ELECTROCARDIOGRAM TRACING: CPT

## 2022-12-04 PROCEDURE — 71046 X-RAY EXAM CHEST 2 VIEWS: CPT

## 2022-12-04 PROCEDURE — 84484 ASSAY OF TROPONIN QUANT: CPT

## 2022-12-04 PROCEDURE — 83605 ASSAY OF LACTIC ACID: CPT | Performed by: EMERGENCY MEDICINE

## 2022-12-04 PROCEDURE — 96374 THER/PROPH/DIAG INJ IV PUSH: CPT

## 2022-12-04 PROCEDURE — 36415 COLL VENOUS BLD VENIPUNCTURE: CPT

## 2022-12-04 PROCEDURE — 80053 COMPREHEN METABOLIC PANEL: CPT

## 2022-12-04 PROCEDURE — 87040 BLOOD CULTURE FOR BACTERIA: CPT | Performed by: EMERGENCY MEDICINE

## 2022-12-04 PROCEDURE — 0202U NFCT DS 22 TRGT SARS-COV-2: CPT | Performed by: EMERGENCY MEDICINE

## 2022-12-04 PROCEDURE — 94640 AIRWAY INHALATION TREATMENT: CPT

## 2022-12-04 RX ORDER — METHYLPREDNISOLONE SODIUM SUCCINATE 125 MG/2ML
125 INJECTION, POWDER, LYOPHILIZED, FOR SOLUTION INTRAMUSCULAR; INTRAVENOUS ONCE
Status: COMPLETED | OUTPATIENT
Start: 2022-12-04 | End: 2022-12-04

## 2022-12-04 RX ORDER — ALBUTEROL SULFATE 2.5 MG/3ML
2.5 SOLUTION RESPIRATORY (INHALATION) ONCE
Status: COMPLETED | OUTPATIENT
Start: 2022-12-04 | End: 2022-12-04

## 2022-12-04 RX ORDER — PREDNISONE 50 MG/1
50 TABLET ORAL DAILY
Qty: 5 TABLET | Refills: 0 | Status: SHIPPED | OUTPATIENT
Start: 2022-12-04 | End: 2022-12-09

## 2022-12-04 RX ORDER — CEFDINIR 300 MG/1
300 CAPSULE ORAL 2 TIMES DAILY
Qty: 14 CAPSULE | Refills: 0 | Status: SHIPPED | OUTPATIENT
Start: 2022-12-04 | End: 2022-12-11

## 2022-12-04 RX ORDER — OSELTAMIVIR PHOSPHATE 75 MG/1
75 CAPSULE ORAL 2 TIMES DAILY
Qty: 10 CAPSULE | Refills: 0 | Status: SHIPPED | OUTPATIENT
Start: 2022-12-04 | End: 2022-12-09

## 2022-12-04 RX ORDER — CEFDINIR 300 MG/1
300 CAPSULE ORAL ONCE
Status: COMPLETED | OUTPATIENT
Start: 2022-12-04 | End: 2022-12-04

## 2022-12-04 RX ORDER — SODIUM CHLORIDE 0.9 % (FLUSH) 0.9 %
10 SYRINGE (ML) INJECTION AS NEEDED
Status: DISCONTINUED | OUTPATIENT
Start: 2022-12-04 | End: 2022-12-04 | Stop reason: HOSPADM

## 2022-12-04 RX ADMIN — Medication 10 ML: at 20:09

## 2022-12-04 RX ADMIN — ALBUTEROL SULFATE 2.5 MG: 2.5 SOLUTION RESPIRATORY (INHALATION) at 20:27

## 2022-12-04 RX ADMIN — METHYLPREDNISOLONE SODIUM SUCCINATE 125 MG: 125 INJECTION, POWDER, FOR SOLUTION INTRAMUSCULAR; INTRAVENOUS at 20:07

## 2022-12-04 RX ADMIN — CEFDINIR 300 MG: 300 CAPSULE ORAL at 21:12

## 2022-12-04 NOTE — ED NOTES
Pt arrives via personal vehicle for chest pain, chest congestion, headache, and 'soreness everywhere'.    This RN in appropriate PPE while in pt room. Pt wearing mask

## 2022-12-05 ENCOUNTER — LAB (OUTPATIENT)
Dept: INTERNAL MEDICINE | Facility: CLINIC | Age: 52
End: 2022-12-05

## 2022-12-05 DIAGNOSIS — I10 ESSENTIAL HYPERTENSION: ICD-10-CM

## 2022-12-05 DIAGNOSIS — R35.1 NOCTURIA: ICD-10-CM

## 2022-12-05 DIAGNOSIS — E78.5 HYPERLIPIDEMIA, UNSPECIFIED HYPERLIPIDEMIA TYPE: ICD-10-CM

## 2022-12-05 DIAGNOSIS — R73.03 PREDIABETES: ICD-10-CM

## 2022-12-05 DIAGNOSIS — E55.9 VITAMIN D DEFICIENCY: Primary | ICD-10-CM

## 2022-12-05 LAB — QT INTERVAL: 302 MS

## 2022-12-05 NOTE — ED PROVIDER NOTES
EMERGENCY DEPARTMENT ENCOUNTER    Room Number:  29/29  Date seen:  12/4/2022  PCP: Savanna Dang APRN  Historian: Patient      HPI:  Chief Complaint: Cough, short of breath  A complete HPI/ROS/PMH/PSH/SH/FH are unobtainable due to: Nothing  Context: Georgi Khanna is a 52 y.o. male who presents to the ED c/o cough and shortness of breath.  He also reports that he has had fever that began last night, up to 101 at home today.  Patient has a history of asthma.  He reports that around November 13 he began having some cough and congestion.  He was prescribed a Z-Bala and steroids at that time and his symptoms resolved after about a week.  Just over the last 2 days symptoms have recurred.  He did not have fever during the first illness but he does have fever now.  He denies nausea, vomiting, diarrhea.  He has had some pain in his hips/groin area bilaterally, also pain diffusely in his muscles.  He has not had a flu shot this year.            PAST MEDICAL HISTORY  Active Ambulatory Problems     Diagnosis Date Noted   • Asthma 05/02/2016   • Morbid obesity due to excess calories (HCC) 05/02/2016   • Snoring 05/02/2016   • Daytime somnolence 05/02/2016   • Vitamin D deficiency 06/06/2016   • Hyperlipidemia 06/06/2016   • Prediabetes 06/06/2016   • Elevated ALT measurement 12/12/2016   • Essential hypertension 07/29/2019   • Incomplete bladder emptying 12/10/2019   • Encounter for screening colonoscopy 12/28/2021     Resolved Ambulatory Problems     Diagnosis Date Noted   • Elevated blood pressure reading 05/02/2016     Past Medical History:   Diagnosis Date   • Pneumonia 1993         PAST SURGICAL HISTORY  Past Surgical History:   Procedure Laterality Date   • COLONOSCOPY N/A 4/25/2022    Procedure: COLONOSCOPY;  Surgeon: Shakir Baca MD;  Location: [facility] MAIN OR;  Service: Gastroenterology;  Laterality: N/A;  hemorrhoids, diverticulosis   • UMBILICAL HERNIA REPAIR           FAMILY HISTORY  Family History   Problem  Relation Age of Onset   • Stroke Mother         40s   • Hypertension Father    • Heart attack Father         in his 50s, Quad BYPASS sx   • Sleep apnea Father    • Hyperlipidemia Father    • Stroke Father    • Seizures Father    • Obesity Brother    • Hypertension Brother    • Heart attack Paternal Grandfather          in his 50s         SOCIAL HISTORY  Social History     Socioeconomic History   • Marital status:      Spouse name: LYNNE    Tobacco Use   • Smoking status: Former     Packs/day: 0.25     Years: 3.00     Pack years: 0.75     Types: Cigarettes     Start date: 6/3/1986     Quit date: 1989     Years since quittin.5   • Smokeless tobacco: Former     Types: Chew   • Tobacco comments:     age [age] about 5 years   Vaping Use   • Vaping Use: Never used   Substance and Sexual Activity   • Alcohol use: Yes     Comment: once monthly   • Drug use: No   • Sexual activity: Yes     Partners: Female     Birth control/protection: Surgical         ALLERGIES  Patient has no known allergies.        REVIEW OF SYSTEMS  Review of Systems   Review of all 14 systems is negative other than stated in the HPI above.      PHYSICAL EXAM  ED Triage Vitals   Temp Heart Rate Resp BP SpO2   22 1706 22 1706 22 1706 22 1714 22 1706   100.3 °F (37.9 °C) (!) 131 16 160/100 94 %      Temp src Heart Rate Source Patient Position BP Location FiO2 (%)   22 1706 22 1706 22 1849 22 1849 --   Tympanic Monitor Lying Left arm          GENERAL: Awake and alert, no acute distress  HENT: nares patent  EYES: no scleral icterus, EOMI  CV: regular rhythm, normal rate, no murmur, no peripheral edema  RESPIRATORY: normal effort, diffuse expiratory wheezing  ABDOMEN: soft, nondistended, nontender throughout  MUSCULOSKELETAL: no deformity  NEURO: alert, moves all extremities, follows commands, cranial nerves II through XII grossly intact, speech fluent and clear  PSYCH:  calm,  cooperative  SKIN: warm, dry    Vital signs and nursing notes reviewed.          LAB RESULTS  Recent Results (from the past 24 hour(s))   ECG 12 Lead Chest Pain    Collection Time: 12/04/22  5:08 PM   Result Value Ref Range    QT Interval 302 ms   Comprehensive Metabolic Panel    Collection Time: 12/04/22  6:03 PM    Specimen: Blood   Result Value Ref Range    Glucose 114 (H) 65 - 99 mg/dL    BUN 8 6 - 20 mg/dL    Creatinine 0.80 0.76 - 1.27 mg/dL    Sodium 135 (L) 136 - 145 mmol/L    Potassium 3.8 3.5 - 5.2 mmol/L    Chloride 98 98 - 107 mmol/L    CO2 23.7 22.0 - 29.0 mmol/L    Calcium 8.8 8.6 - 10.5 mg/dL    Total Protein 7.4 6.0 - 8.5 g/dL    Albumin 4.50 3.50 - 5.20 g/dL    ALT (SGPT) 26 1 - 41 U/L    AST (SGOT) 21 1 - 40 U/L    Alkaline Phosphatase 62 39 - 117 U/L    Total Bilirubin 0.3 0.0 - 1.2 mg/dL    Globulin 2.9 gm/dL    A/G Ratio 1.6 g/dL    BUN/Creatinine Ratio 10.0 7.0 - 25.0    Anion Gap 13.3 5.0 - 15.0 mmol/L    eGFR 107.1 >60.0 mL/min/1.73   Troponin    Collection Time: 12/04/22  6:03 PM    Specimen: Blood   Result Value Ref Range    Troponin T <0.010 0.000 - 0.030 ng/mL   Green Top (Gel)    Collection Time: 12/04/22  6:03 PM   Result Value Ref Range    Extra Tube Hold for add-ons.    Lavender Top    Collection Time: 12/04/22  6:03 PM   Result Value Ref Range    Extra Tube hold for add-on    Gold Top - SST    Collection Time: 12/04/22  6:03 PM   Result Value Ref Range    Extra Tube Hold for add-ons.    Light Blue Top    Collection Time: 12/04/22  6:03 PM   Result Value Ref Range    Extra Tube Hold for add-ons.    CBC Auto Differential    Collection Time: 12/04/22  6:03 PM    Specimen: Blood   Result Value Ref Range    WBC 5.86 3.40 - 10.80 10*3/mm3    RBC 5.70 4.14 - 5.80 10*6/mm3    Hemoglobin 15.3 13.0 - 17.7 g/dL    Hematocrit 44.8 37.5 - 51.0 %    MCV 78.6 (L) 79.0 - 97.0 fL    MCH 26.8 26.6 - 33.0 pg    MCHC 34.2 31.5 - 35.7 g/dL    RDW 13.0 12.3 - 15.4 %    RDW-SD 37.5 37.0 - 54.0 fl    MPV  8.5 6.0 - 12.0 fL    Platelets 183 140 - 450 10*3/mm3    Neutrophil % 71.7 42.7 - 76.0 %    Lymphocyte % 13.7 (L) 19.6 - 45.3 %    Monocyte % 12.6 (H) 5.0 - 12.0 %    Eosinophil % 0.3 0.3 - 6.2 %    Basophil % 1.4 0.0 - 1.5 %    Immature Grans % 0.3 0.0 - 0.5 %    Neutrophils, Absolute 4.20 1.70 - 7.00 10*3/mm3    Lymphocytes, Absolute 0.80 0.70 - 3.10 10*3/mm3    Monocytes, Absolute 0.74 0.10 - 0.90 10*3/mm3    Eosinophils, Absolute 0.02 0.00 - 0.40 10*3/mm3    Basophils, Absolute 0.08 0.00 - 0.20 10*3/mm3    Immature Grans, Absolute 0.02 0.00 - 0.05 10*3/mm3    nRBC 0.0 0.0 - 0.2 /100 WBC   Procalcitonin    Collection Time: 12/04/22  6:03 PM    Specimen: Blood   Result Value Ref Range    Procalcitonin 0.11 0.00 - 0.25 ng/mL   Respiratory Panel PCR w/COVID-19(SARS-CoV-2) DEVEN/GABBY/ANTOINE/PAD/COR/MAD/DESIRE In-House, NP Swab in [facility] 3-4 HR TAT - Swab, Nasopharynx    Collection Time: 12/04/22  6:40 PM    Specimen: Nasopharynx; Swab   Result Value Ref Range    ADENOVIRUS, PCR Not Detected Not Detected    Coronavirus 229E Not Detected Not Detected    Coronavirus HKU1 Not Detected Not Detected    Coronavirus NL63 Not Detected Not Detected    Coronavirus OC43 Not Detected Not Detected    COVID19 Not Detected Not Detected - Ref. Range    Human Metapneumovirus Not Detected Not Detected    Human Rhinovirus/Enterovirus Not Detected Not Detected    Influenza A H1 2009 PCR Detected (A) Not Detected    Influenza B PCR Not Detected Not Detected    Parainfluenza Virus 1 Not Detected Not Detected    Parainfluenza Virus 2 Not Detected Not Detected    Parainfluenza Virus 3 Not Detected Not Detected    Parainfluenza Virus 4 Not Detected Not Detected    RSV, PCR Not Detected Not Detected    Bordetella pertussis pcr Not Detected Not Detected    Bordetella parapertussis PCR Not Detected Not Detected    Chlamydophila pneumoniae PCR Not Detected Not Detected    Mycoplasma pneumo by PCR Not Detected Not Detected   Lactic Acid, Plasma     Collection Time: 12/04/22  6:46 PM    Specimen: Arm, Right; Blood   Result Value Ref Range    Lactate 1.2 0.5 - 2.0 mmol/L   Troponin    Collection Time: 12/04/22  8:06 PM    Specimen: Blood   Result Value Ref Range    Troponin T <0.010 0.000 - 0.030 ng/mL       Ordered the above labs and reviewed the results.        RADIOLOGY  XR Chest 2 View    Result Date: 12/4/2022  XR CHEST 2 VW-  Clinical: Chest pain  COMPARISON: None  FINDINGS: Heart size within normal limits. No mediastinal or hilar abnormality.. No effusion or edema. There is a subtle infiltrate demonstrated within the right middle lobe. The left lung is clear.  CONCLUSION: Right middle lobe infiltrate.  This report was finalized on 12/4/2022 5:31 PM by Dr. Israel Balderas M.D.        Ordered the above noted radiological studies. Reviewed by me in PACS.            PROCEDURES  Procedures              MEDICATIONS GIVEN IN ER  Medications   sodium chloride 0.9 % flush 10 mL (10 mL Intravenous Given 12/4/22 2009)   albuterol (PROVENTIL) nebulizer solution 0.083% 2.5 mg/3mL (2.5 mg Nebulization Given 12/4/22 2027)   methylPREDNISolone sodium succinate (SOLU-Medrol) injection 125 mg (125 mg Intravenous Given 12/4/22 2007)   cefdinir (OMNICEF) capsule 300 mg (300 mg Oral Given 12/4/22 2112)                   MEDICAL DECISION MAKING, PROGRESS, and CONSULTS    All labs have been independently reviewed by me.  All radiology studies have been reviewed by me and discussed with radiologist dictating the report.   EKG's independently viewed and interpreted by me.  Discussion below represents my analysis of pertinent findings related to patient's condition, differential diagnosis, treatment plan and final disposition.      Differential diagnosis:  My differential diagnosis for dyspnea includes but is not limited to:  Asthma, COPD, pneumonia, pulmonary embolus, acute respiratory distress syndrome, pneumothorax, pleural effusion, pulmonary fibrosis, congestive heart failure,  myocardial infarction, DKA, uremia, acidosis, sepsis, anemia, drug related, hyperventilation, CNS disease      ED Course as of 12/04/22 2200   Sun Dec 04, 2022   2006 Influenza A H1 2009 PCR(!): Detected [JR]      ED Course User Index  [JR] Raymond Dye MD     Chest x-ray dependently reviewed in PACS.  There is subtle infiltrate in the right middle lobe.  This might be secondary to his influenza however I cannot rule out a secondary bacterial pneumonia.  I discussed plan to go ahead and treat him with Tamiflu given onset of symptoms within the last 48 hours, steroids for asthma exacerbation, continue albuterol as needed, and also oral antibiotics in case of bacterial pneumonia.  He is not hypoxic.  He is in no respiratory distress.  He is appropriate discharge home at this time with return precautions.         I wore an N95 mask, face shield, and gloves during this patient encounter.  Patient also wearing a surgical mask.  Hand hygeine performed before and after seeing the patient.    DIAGNOSIS  Final diagnoses:   Influenza A   Mild persistent asthma with exacerbation   Pneumonia of right lung due to infectious organism, unspecified part of lung         DISPOSITION  DISCHARGE    Patient discharged in stable condition.    Reviewed implications of results, diagnosis, meds, responsibility to follow up, warning signs and symptoms of possible worsening, potential complications and reasons to return to ER.    Patient/Family voiced understanding of above instructions.    Discussed plan for discharge, as there is no emergent indication for admission. Patient referred to primary care provider for BP management due to today's BP. Pt/family is agreeable and understands need for follow up and repeat testing.  Pt is aware that discharge does not mean that nothing is wrong but it indicates no emergency is present that requires admission and they must continue care with follow-up as given below or physician of their  choice.     FOLLOW-UP  Savanna Dang, APRN  [address]  [facility] [address]  [place] [id number] [phone]    Schedule an appointment as soon as possible for a visit            Medication List      New Prescriptions    cefdinir 300 MG capsule  Commonly known as: OMNICEF  Take 1 capsule by mouth 2 (Two) Times a Day for 7 days.     oseltamivir 75 MG capsule  Commonly known as: Tamiflu  Take 1 capsule by mouth 2 (Two) Times a Day for 5 days.     predniSONE 50 MG tablet  Commonly known as: DELTASONE  Take 1 tablet by mouth Daily for 5 days.        Stop    azithromycin 250 MG tablet  Commonly known as: Zithromax     methylPREDNISolone 4 MG dose pack  Commonly known as: MEDROL           Where to Get Your Medications      These medications were sent to [facility]/pharmacy #[id number] - [place], [place] - [address] AT [facility] - [phone]  - [phone]   [address], [place] [address]    Phone: [phone]   · cefdinir 300 MG capsule  · oseltamivir 75 MG capsule  · predniSONE 50 MG tablet                   Latest Documented Vital Signs:  As of 22:00 EST  BP- 148/90 HR- 93 Temp- 100.3 °F (37.9 °C) (Tympanic) O2 sat- 92%        --    Please note that portions of this were completed with a voice recognition program.            Raymond Dye MD  12/04/22 3548

## 2022-12-09 LAB
BACTERIA SPEC AEROBE CULT: NORMAL
BACTERIA SPEC AEROBE CULT: NORMAL

## 2022-12-12 ENCOUNTER — OFFICE VISIT (OUTPATIENT)
Dept: INTERNAL MEDICINE | Facility: CLINIC | Age: 52
End: 2022-12-12

## 2022-12-12 ENCOUNTER — TELEPHONE (OUTPATIENT)
Dept: INTERNAL MEDICINE | Facility: CLINIC | Age: 52
End: 2022-12-12

## 2022-12-12 VITALS
BODY MASS INDEX: 35.7 KG/M2 | OXYGEN SATURATION: 96 % | HEIGHT: 71 IN | SYSTOLIC BLOOD PRESSURE: 128 MMHG | HEART RATE: 114 BPM | WEIGHT: 255 LBS | DIASTOLIC BLOOD PRESSURE: 98 MMHG | TEMPERATURE: 98 F

## 2022-12-12 DIAGNOSIS — J18.9 PNEUMONIA OF RIGHT MIDDLE LOBE DUE TO INFECTIOUS ORGANISM: Primary | ICD-10-CM

## 2022-12-12 DIAGNOSIS — J11.1 FLU: ICD-10-CM

## 2022-12-12 DIAGNOSIS — J45.40 MODERATE PERSISTENT ASTHMA WITHOUT COMPLICATION: ICD-10-CM

## 2022-12-12 DIAGNOSIS — J45.901 MILD ASTHMA WITH EXACERBATION, UNSPECIFIED WHETHER PERSISTENT: ICD-10-CM

## 2022-12-12 DIAGNOSIS — J45.998 UNCONTROLLED PERSISTENT ASTHMA: ICD-10-CM

## 2022-12-12 PROCEDURE — 99214 OFFICE O/P EST MOD 30 MIN: CPT | Performed by: NURSE PRACTITIONER

## 2022-12-12 RX ORDER — ALBUTEROL SULFATE 90 UG/1
2 AEROSOL, METERED RESPIRATORY (INHALATION) EVERY 4 HOURS PRN
Qty: 8 G | Refills: 0 | Status: SHIPPED | OUTPATIENT
Start: 2022-12-12

## 2022-12-12 RX ORDER — METHYLPREDNISOLONE 4 MG/1
TABLET ORAL
Qty: 21 EACH | Refills: 0 | Status: SHIPPED | OUTPATIENT
Start: 2022-12-12 | End: 2022-12-21

## 2022-12-12 RX ORDER — MOMETASONE FUROATE AND FORMOTEROL FUMARATE DIHYDRATE 200; 5 UG/1; UG/1
2 AEROSOL RESPIRATORY (INHALATION)
Qty: 13 G | Refills: 11 | Status: SHIPPED | OUTPATIENT
Start: 2022-12-12 | End: 2022-12-21

## 2022-12-12 NOTE — TELEPHONE ENCOUNTER
Caller: Georgi Khanna    Relationship: Self    Best call back number: [phone]     What medications are you currently taking:   Current Outpatient Medications on File Prior to Visit   Medication Sig Dispense Refill   • albuterol sulfate  (90 Base) MCG/ACT inhaler Inhale 2 puffs Every 4 (Four) Hours As Needed for Wheezing or Shortness of Air. 8 g 0   • [] cefdinir (OMNICEF) 300 MG capsule Take 1 capsule by mouth 2 (Two) Times a Day for 7 days. 14 capsule 0   • hydroCHLOROthiazide (HYDRODIURIL) 12.5 MG tablet Take 1 tablet by mouth Daily. 90 tablet 1   • methylPREDNISolone (MEDROL) 4 MG dose pack Take as directed on package instructions. 21 each 0   • mometasone-formoterol (Dulera) 200-5 MCG/ACT inhaler Inhale 2 puffs 2 (Two) Times a Day. 13 g 11   • montelukast (SINGULAIR) 10 MG tablet Take 1 tablet by mouth every night at bedtime. 90 tablet 1   • Trulicity 1.5 MG/0.5ML solution pen-injector INJECT 1.5 MG UNDER THE SKIN INTO THE APPROPRIATE AREA AS DIRECTED 1 (ONE) TIME PER WEEK. 7.5 mL 3   • [DISCONTINUED] albuterol sulfate  (90 Base) MCG/ACT inhaler Inhale 2 puffs Every 4 (Four) Hours As Needed for Wheezing or Shortness of Air. 8 g 0   • [DISCONTINUED] mometasone-formoterol (Dulera) 200-5 MCG/ACT inhaler Inhale 2 puffs 2 (Two) Times a Day. 13 g 11     No current facility-administered medications on file prior to visit.      PLEASE CALL PATIENT AND ADVISE:    PHARMACY WOULD NOT FILL THE SCRIPT SENT IN TODAY FOR THE MOMETASONE FORMOTEROL (DULERA) 200-D MCG/ACT INHALER

## 2022-12-12 NOTE — PROGRESS NOTES
"Transitional Care Follow Up Visit  Subjective     Georgi Khanna is a 52 y.o. male who presents for a transitional care management visit.flu and pneumonia    Within 48 business hours after discharge our office contacted him via telephone to coordinate his care and needs.      I reviewed and discussed the details of that call along with the discharge summary, hospital problems, inpatient lab results, inpatient diagnostic studies, and consultation reports with Georgi.     Current outpatient and discharge medications have been reconciled for the patient.  Reviewed by: CHERYLE Hensley      No flowsheet data found.  Risk for Readmission (LACE) No data recorded    History of Present Illness   Course During Hospital Stay: Was at [facility] ER December 4, 2022.  Patient had complaints of fever, cough and shortness of breath.  Symptoms started on November 13, was prescribed Z-Bala and steroids per urgent care center.  This improved and then 2 days prior to ER admission symptoms occurred.  Patient was influenza A positive.  Also had right middle lobe pneumonia.  Patient was discharged with cefdinir, Tamiflu and prednisone.   Still with wheezing and fatigue. Occasional SOB even prior to pneumonia. \"Chasidy been sick since May.\"     Prediabetes- stopped trulicity about 3 weeks ago, it was helping.   The following portions of the patient's history were reviewed and updated as appropriate: allergies, current medications, past family history, past medical history, past social history, past surgical history and problem list.    Review of Systems   Constitutional: Positive for fatigue. Negative for chills and fever.   Respiratory: Positive for cough, shortness of breath and wheezing.    Cardiovascular: Negative.    Psychiatric/Behavioral: Negative.        Objective   Physical Exam  Constitutional:       Appearance: Normal appearance. He is well-developed.   Neck:      Thyroid: No thyromegaly.   Cardiovascular:      " Rate and Rhythm: Normal rate and regular rhythm.      Heart sounds: Normal heart sounds.   Pulmonary:      Effort: Pulmonary effort is normal.      Breath sounds: Decreased air movement present. Examination of the right-upper field reveals wheezing. Examination of the left-upper field reveals wheezing. Examination of the right-middle field reveals wheezing. Examination of the left-middle field reveals wheezing. Examination of the right-lower field reveals wheezing. Examination of the left-lower field reveals wheezing. Wheezing present.   Musculoskeletal:      Cervical back: Normal range of motion and neck supple.   Lymphadenopathy:      Cervical: No cervical adenopathy.   Skin:     General: Skin is warm and dry.   Neurological:      Mental Status: He is alert.   Psychiatric:         Behavior: Behavior normal.         Thought Content: Thought content normal.         Judgment: Judgment normal.         Assessment & Plan   Diagnoses and all orders for this visit:    1. Pneumonia of right middle lobe due to infectious organism (Primary)  -     XR Chest PA & Lateral; Future    2. Moderate persistent asthma without complication  -     mometasone-formoterol (Dulera) 200-5 MCG/ACT inhaler; Inhale 2 puffs 2 (Two) Times a Day.  Dispense: 13 g; Refill: 11    3. Flu    4. Uncontrolled persistent asthma  -     albuterol sulfate  (90 Base) MCG/ACT inhaler; Inhale 2 puffs Every 4 (Four) Hours As Needed for Wheezing or Shortness of Air.  Dispense: 8 g; Refill: 0  -     methylPREDNISolone (MEDROL) 4 MG dose pack; Take as directed on package instructions.  Dispense: 21 each; Refill: 0  -     Ambulatory Referral to Pulmonology    5. Mild asthma with exacerbation, unspecified whether persistent               1. Pneumonia/Flu- needs f/u CXR, restart mucinex  2. Uncontrolled asthma- will fill dulera, place referral to pulm, f/u in 1-2 weeks, notify for any s/s of infection

## 2022-12-13 LAB
25(OH)D3+25(OH)D2 SERPL-MCNC: 23.2 NG/ML (ref 30–100)
ALBUMIN SERPL-MCNC: 4.4 G/DL (ref 3.5–5.2)
ALBUMIN/GLOB SERPL: 1.5 G/DL
ALP SERPL-CCNC: 76 U/L (ref 39–117)
ALT SERPL-CCNC: 29 U/L (ref 1–41)
AST SERPL-CCNC: 18 U/L (ref 1–40)
BASOPHILS # BLD MANUAL: 0.3 10*3/MM3 (ref 0–0.2)
BASOPHILS NFR BLD MANUAL: 2 % (ref 0–1.5)
BILIRUB SERPL-MCNC: 0.3 MG/DL (ref 0–1.2)
BUN SERPL-MCNC: 18 MG/DL (ref 6–20)
BUN/CREAT SERPL: 23.4 (ref 7–25)
CALCIUM SERPL-MCNC: 10 MG/DL (ref 8.6–10.5)
CHLORIDE SERPL-SCNC: 99 MMOL/L (ref 98–107)
CHOLEST SERPL-MCNC: 237 MG/DL (ref 0–200)
CHOLEST/HDLC SERPL: 4.31 {RATIO}
CO2 SERPL-SCNC: 31.1 MMOL/L (ref 22–29)
CREAT SERPL-MCNC: 0.77 MG/DL (ref 0.76–1.27)
DIFFERENTIAL COMMENT: ABNORMAL
EGFRCR SERPLBLD CKD-EPI 2021: 108.4 ML/MIN/1.73
EOSINOPHIL # BLD MANUAL: 1.06 10*3/MM3 (ref 0–0.4)
EOSINOPHIL NFR BLD MANUAL: 7 % (ref 0.3–6.2)
ERYTHROCYTE [DISTWIDTH] IN BLOOD BY AUTOMATED COUNT: 13.6 % (ref 12.3–15.4)
GLOBULIN SER CALC-MCNC: 3 GM/DL
GLUCOSE SERPL-MCNC: 111 MG/DL (ref 65–99)
HBA1C MFR BLD: 6.6 % (ref 4.8–5.6)
HCT VFR BLD AUTO: 51.7 % (ref 37.5–51)
HDLC SERPL-MCNC: 55 MG/DL (ref 40–60)
HGB BLD-MCNC: 17.4 G/DL (ref 13–17.7)
LDLC SERPL CALC-MCNC: 149 MG/DL (ref 0–100)
LYMPHOCYTES # BLD MANUAL: 3.03 10*3/MM3 (ref 0.7–3.1)
LYMPHOCYTES NFR BLD MANUAL: 20 % (ref 19.6–45.3)
MCH RBC QN AUTO: 27.2 PG (ref 26.6–33)
MCHC RBC AUTO-ENTMCNC: 33.7 G/DL (ref 31.5–35.7)
MCV RBC AUTO: 80.8 FL (ref 79–97)
MONOCYTES # BLD MANUAL: 0.76 10*3/MM3 (ref 0.1–0.9)
MONOCYTES NFR BLD MANUAL: 5 % (ref 5–12)
NEUTROPHILS # BLD MANUAL: 9.83 10*3/MM3 (ref 1.7–7)
NEUTROPHILS NFR BLD MANUAL: 65 % (ref 42.7–76)
NRBC BLD AUTO-RTO: 0 /100 WBC (ref 0–0.2)
PLATELET # BLD AUTO: 351 10*3/MM3 (ref 140–450)
PLATELET BLD QL SMEAR: ABNORMAL
POTASSIUM SERPL-SCNC: 5.2 MMOL/L (ref 3.5–5.2)
PROT SERPL-MCNC: 7.4 G/DL (ref 6–8.5)
PSA SERPL-MCNC: 0.65 NG/ML (ref 0–4)
RBC # BLD AUTO: 6.4 10*6/MM3 (ref 4.14–5.8)
RBC MORPH BLD: ABNORMAL
SODIUM SERPL-SCNC: 140 MMOL/L (ref 136–145)
TRIGL SERPL-MCNC: 181 MG/DL (ref 0–150)
TSH SERPL DL<=0.005 MIU/L-ACNC: 1.78 UIU/ML (ref 0.27–4.2)
VLDLC SERPL CALC-MCNC: 33 MG/DL (ref 5–40)
WBC # BLD AUTO: 15.13 10*3/MM3 (ref 3.4–10.8)

## 2022-12-21 ENCOUNTER — OFFICE VISIT (OUTPATIENT)
Dept: INTERNAL MEDICINE | Facility: CLINIC | Age: 52
End: 2022-12-21

## 2022-12-21 VITALS
TEMPERATURE: 97.7 F | OXYGEN SATURATION: 99 % | WEIGHT: 259 LBS | HEART RATE: 89 BPM | BODY MASS INDEX: 36.26 KG/M2 | HEIGHT: 71 IN | DIASTOLIC BLOOD PRESSURE: 80 MMHG | SYSTOLIC BLOOD PRESSURE: 122 MMHG

## 2022-12-21 DIAGNOSIS — R40.0 DAYTIME SOMNOLENCE: ICD-10-CM

## 2022-12-21 DIAGNOSIS — E78.5 HYPERLIPIDEMIA, UNSPECIFIED HYPERLIPIDEMIA TYPE: ICD-10-CM

## 2022-12-21 DIAGNOSIS — E55.9 VITAMIN D DEFICIENCY: ICD-10-CM

## 2022-12-21 DIAGNOSIS — Z00.00 HEALTH CARE MAINTENANCE: Primary | ICD-10-CM

## 2022-12-21 DIAGNOSIS — J45.31 MILD PERSISTENT ASTHMA WITH ACUTE EXACERBATION: ICD-10-CM

## 2022-12-21 DIAGNOSIS — E11.9 TYPE 2 DIABETES MELLITUS WITHOUT COMPLICATION, WITHOUT LONG-TERM CURRENT USE OF INSULIN: ICD-10-CM

## 2022-12-21 PROCEDURE — 99396 PREV VISIT EST AGE 40-64: CPT | Performed by: NURSE PRACTITIONER

## 2022-12-21 PROCEDURE — 90471 IMMUNIZATION ADMIN: CPT | Performed by: NURSE PRACTITIONER

## 2022-12-21 PROCEDURE — 90677 PCV20 VACCINE IM: CPT | Performed by: NURSE PRACTITIONER

## 2022-12-21 RX ORDER — DULAGLUTIDE 0.75 MG/.5ML
0.75 INJECTION, SOLUTION SUBCUTANEOUS WEEKLY
Qty: 0.5 ML | Refills: 0 | Status: SHIPPED | OUTPATIENT
Start: 2022-12-21 | End: 2023-02-13

## 2022-12-21 RX ORDER — BUDESONIDE AND FORMOTEROL FUMARATE DIHYDRATE 160; 4.5 UG/1; UG/1
2 AEROSOL RESPIRATORY (INHALATION)
Qty: 10.2 EACH | Refills: 12 | Status: SHIPPED | OUTPATIENT
Start: 2022-12-21 | End: 2022-12-28

## 2022-12-21 NOTE — PROGRESS NOTES
Subjective   Georgi Khanna is a 52 y.o. male.     History of Present Illness   The patient is here today for CPE and lab work F/U. He is feeling better.     Asthma- could not get steroid inhaler.     Prediabetes- has not started back with trulicity     Dad  2022 failure to thrive, mom  in  also failure to thrive   Son is 23 yrs old.   The following portions of the patient's history were reviewed and updated as appropriate: allergies, current medications, past family history, past medical history, past social history, past surgical history and problem list.    Review of Systems   Constitutional: Negative for chills and fever.   HENT: Positive for rhinorrhea (in am). Negative for ear pain and sore throat.    Eyes: Negative.    Respiratory: Positive for cough (improving) and shortness of breath (improving). Negative for wheezing.    Cardiovascular: Negative.    Gastrointestinal: Negative.    Endocrine: Negative for cold intolerance and heat intolerance.   Genitourinary: Negative for decreased urine volume, difficulty urinating, discharge, dysuria, flank pain, frequency, genital sores, hematuria, penile pain, erectile dysfunction, testicular pain and urgency.   Musculoskeletal: Negative.    Skin: Negative.    Allergic/Immunologic: Negative for environmental allergies and food allergies.   Neurological: Negative.    Hematological: Negative.    Psychiatric/Behavioral: Negative for dysphoric mood and suicidal ideas. The patient is not nervous/anxious.        Objective   Physical Exam  Constitutional:       Appearance: Normal appearance. He is well-developed.   HENT:      Right Ear: Hearing, tympanic membrane, ear canal and external ear normal.      Left Ear: Hearing, tympanic membrane, ear canal and external ear normal.   Eyes:      General: Lids are normal.      Conjunctiva/sclera: Conjunctivae normal.      Pupils: Pupils are equal, round, and reactive to light.   Neck:      Thyroid: No thyromegaly.       Vascular: No carotid bruit.      Trachea: Trachea normal.   Cardiovascular:      Rate and Rhythm: Normal rate and regular rhythm.      Heart sounds: Normal heart sounds.   Pulmonary:      Effort: Pulmonary effort is normal.      Breath sounds: Normal breath sounds.   Abdominal:      General: Bowel sounds are normal.      Palpations: Abdomen is soft.      Tenderness: There is no abdominal tenderness.   Musculoskeletal:         General: Normal range of motion.      Cervical back: Normal range of motion and neck supple.   Lymphadenopathy:      Cervical: No cervical adenopathy.      Upper Body:      Right upper body: No supraclavicular adenopathy.      Left upper body: No supraclavicular adenopathy.   Skin:     General: Skin is warm and dry.   Neurological:      Mental Status: He is alert and oriented to person, place, and time.      Cranial Nerves: No cranial nerve deficit.      Sensory: No sensory deficit.      Deep Tendon Reflexes:      Reflex Scores:       Patellar reflexes are 0 on the right side and 0 on the left side.  Psychiatric:         Speech: Speech normal.         Behavior: Behavior normal.         Thought Content: Thought content normal.         Judgment: Judgment normal.         Vitals:    12/21/22 1457   BP: 122/80   Pulse: 89   Temp: 97.7 °F (36.5 °C)   SpO2: 99%     Body mass index is 36.14 kg/m².    Current Outpatient Medications:   •  albuterol sulfate  (90 Base) MCG/ACT inhaler, Inhale 2 puffs Every 4 (Four) Hours As Needed for Wheezing or Shortness of Air., Disp: 8 g, Rfl: 0  •  hydroCHLOROthiazide (HYDRODIURIL) 12.5 MG tablet, Take 1 tablet by mouth Daily., Disp: 90 tablet, Rfl: 1  •  montelukast (SINGULAIR) 10 MG tablet, Take 1 tablet by mouth every night at bedtime., Disp: 90 tablet, Rfl: 1  •  budesonide-formoterol (Symbicort) 160-4.5 MCG/ACT inhaler, Inhale 2 puffs 2 (Two) Times a Day., Disp: 10.2 each, Rfl: 12  •  Dulaglutide (Trulicity) 0.75 MG/0.5ML solution pen-injector, Inject  0.75 mg under the skin into the appropriate area as directed 1 (One) Time Per Week., Disp: 0.5 mL, Rfl: 0   Lab on 12/05/2022   Component Date Value Ref Range Status   • WBC 12/12/2022 15.13 (H)  3.40 - 10.80 10*3/mm3 Final   • RBC 12/12/2022 6.40 (H)  4.14 - 5.80 10*6/mm3 Final   • Hemoglobin 12/12/2022 17.4  13.0 - 17.7 g/dL Final   • Hematocrit 12/12/2022 51.7 (H)  37.5 - 51.0 % Final   • MCV 12/12/2022 80.8  79.0 - 97.0 fL Final   • MCH 12/12/2022 27.2  26.6 - 33.0 pg Final   • MCHC 12/12/2022 33.7  31.5 - 35.7 g/dL Final   • RDW 12/12/2022 13.6  12.3 - 15.4 % Final   • Platelets 12/12/2022 351  140 - 450 10*3/mm3 Final   • nRBC 12/12/2022 0.0  0.0 - 0.2 /100 WBC Final   • Glucose 12/12/2022 111 (H)  65 - 99 mg/dL Final   • BUN 12/12/2022 18  6 - 20 mg/dL Final   • Creatinine 12/12/2022 0.77  0.76 - 1.27 mg/dL Final   • EGFR Result 12/12/2022 108.4  >60.0 mL/min/1.73 Final    Comment: National Kidney Foundation and American Society of  Nephrology (ASN) Task Force recommended calculation based  on the Chronic Kidney Disease Epidemiology Collaboration  (CKD-EPI) equation refit without adjustment for race.  GFR Normal >60  Chronic Kidney Disease <60  Kidney Failure <15     • BUN/Creatinine Ratio 12/12/2022 23.4  7.0 - 25.0 Final   • Sodium 12/12/2022 140  136 - 145 mmol/L Final   • Potassium 12/12/2022 5.2  3.5 - 5.2 mmol/L Final   • Chloride 12/12/2022 99  98 - 107 mmol/L Final   • Total CO2 12/12/2022 31.1 (H)  22.0 - 29.0 mmol/L Final   • Calcium 12/12/2022 10.0  8.6 - 10.5 mg/dL Final   • Total Protein 12/12/2022 7.4  6.0 - 8.5 g/dL Final   • Albumin 12/12/2022 4.40  3.50 - 5.20 g/dL Final   • Globulin 12/12/2022 3.0  gm/dL Final   • A/G Ratio 12/12/2022 1.5  g/dL Final   • Total Bilirubin 12/12/2022 0.3  0.0 - 1.2 mg/dL Final   • Alkaline Phosphatase 12/12/2022 76  39 - 117 U/L Final   • AST (SGOT) 12/12/2022 18  1 - 40 U/L Final   • ALT (SGPT) 12/12/2022 29  1 - 41 U/L Final   • Total Cholesterol 12/12/2022  237 (H)  0 - 200 mg/dL Final    Comment: Cholesterol Reference Ranges  (U.S. Department of Health and Human Services ATP III  Classifications)  Desirable          <200 mg/dL  Borderline High    200-239 mg/dL  High Risk          >240 mg/dL  Triglyceride Reference Ranges  (U.S. Department of Health and Human Services ATP III  Classifications)  Normal           <150 mg/dL  Borderline High  150-199 mg/dL  High             200-499 mg/dL  Very High        >500 mg/dL  HDL Reference Ranges  (U.S. Department of Health and Human Services ATP III  Classifications)  Low     <40 mg/dl (major risk factor for CHD)  High    >60 mg/dl ('negative' risk factor for CHD)  LDL Reference Ranges  (U.S. Department of Health and Human Services ATP III  Classifications)  Optimal          <100 mg/dL  Near Optimal     100-129 mg/dL  Borderline High  130-159 mg/dL  High             160-189 mg/dL  Very High        >189 mg/dL     • Triglycerides 12/12/2022 181 (H)  0 - 150 mg/dL Final   • HDL Cholesterol 12/12/2022 55  40 - 60 mg/dL Final   • VLDL Cholesterol Buzz 12/12/2022 33  5 - 40 mg/dL Final   • LDL Chol Calc (NIH) 12/12/2022 149 (H)  0 - 100 mg/dL Final   • Chol/HDL Ratio 12/12/2022 4.31   Final   • TSH 12/12/2022 1.780  0.270 - 4.200 uIU/mL Final   • PSA 12/12/2022 0.652  0.000 - 4.000 ng/mL Final    Results may be falsely decreased if patient taking Biotin.   • 25 Hydroxy, Vitamin D 12/12/2022 23.2 (L)  30.0 - 100.0 ng/ml Final    Comment: Results may be falsely increased if patient taking Biotin.  Reference Range for Total Vitamin D 25(OH)  Deficiency <20.0 ng/mL  Insufficiency 21-29 ng/mL  Sufficiency  ng/mL  Toxicity >100 ng/ml     • Hemoglobin A1C 12/12/2022 6.60 (H)  4.80 - 5.60 % Final    Comment: Hemoglobin A1C Ranges:  Increased Risk for Diabetes  5.7% to 6.4%  Diabetes                     >= 6.5%  Diabetic Goal                < 7.0%     • Neutrophil Rel % 12/12/2022 65.0  42.7 - 76.0 % Final   • Lymphocyte Rel % 12/12/2022  20.0  19.6 - 45.3 % Final   • Monocyte Rel % 12/12/2022 5.0  5.0 - 12.0 % Final   • Eosinophil Rel % 12/12/2022 7.0 (H)  0.3 - 6.2 % Final   • Basophil Rel % 12/12/2022 2.0 (H)  0.0 - 1.5 % Final   • Neutrophils Absolute 12/12/2022 9.83 (H)  1.70 - 7.00 10*3/mm3 Final   • Lymphocytes Absolute 12/12/2022 3.03  0.70 - 3.10 10*3/mm3 Final   • Monocytes Absolute 12/12/2022 0.76  0.10 - 0.90 10*3/mm3 Final   • Eosinophil Abs 12/12/2022 1.06 (H)  0.00 - 0.40 10*3/mm3 Final   • Basophils Absolute 12/12/2022 0.30 (H)  0.00 - 0.20 10*3/mm3 Final   • Differential Comment 12/12/2022 Comment   Final    Comment: MYELOCYTE %                  1.0              %          0.0-0.0    H  WBC MORPHOLOGY               Normal                      Normal     N     • Comment 12/12/2022 Comment   Final    Comment: ANISOCYTOSIS                 Slight/1+                   None Seen  N  MICROCYTES                   Slight/1+                   None Seen  N  POIKILOCYTOSIS               Slight/1+                   None Seen  N     • Plt Comment 12/12/2022 Comment   Final    PLATELET MORPHOLOGY          Normal                      Normal     N     Assessment & Plan   Diagnoses and all orders for this visit:    1. Health care maintenance (Primary)    2. Type 2 diabetes mellitus without complication, without long-term current use of insulin ([facility])  -     Ambulatory Referral to Diabetic Education    3. Mild persistent asthma with acute exacerbation    4. Hyperlipidemia, unspecified hyperlipidemia type    5. Vitamin D deficiency    6. Daytime somnolence  -     Ambulatory Referral to Sleep Medicine    Other orders  -     budesonide-formoterol (Symbicort) 160-4.5 MCG/ACT inhaler; Inhale 2 puffs 2 (Two) Times a Day.  Dispense: 10.2 each; Refill: 12  -     Dulaglutide (Trulicity) 0.75 MG/0.5ML solution pen-injector; Inject 0.75 mg under the skin into the appropriate area as directed 1 (One) Time Per Week.  Dispense: 0.5 mL; Refill: 0                Preventative counseling- encouraged pt to work on wt loss through healthy diet and exercise  1. DM2- will restart pt at lower dose to re initiate trulicity, pt would like to hold on metformin, DM2 education classes ordered   2. Asthma- could not get dulera- likely due to need for PA, will send in symbicort first, to schedule with pulm  3. HPL- suggest statin, pt defers for now. Suggest vascular screening in 2023  4. Vit D def- start D3 2000 Iu daily   5. Daytime fatigue- see sleep     Discussed all vaccines.

## 2022-12-28 ENCOUNTER — TELEPHONE (OUTPATIENT)
Dept: INTERNAL MEDICINE | Facility: CLINIC | Age: 52
End: 2022-12-28

## 2022-12-28 RX ORDER — FLUTICASONE FUROATE AND VILANTEROL 200; 25 UG/1; UG/1
1 POWDER RESPIRATORY (INHALATION)
Qty: 28 EACH | Refills: 2 | Status: SHIPPED | OUTPATIENT
Start: 2022-12-28

## 2023-02-13 RX ORDER — DULAGLUTIDE 0.75 MG/.5ML
0.75 INJECTION, SOLUTION SUBCUTANEOUS WEEKLY
Qty: 2 ML | Refills: 2 | Status: SHIPPED | OUTPATIENT
Start: 2023-02-13

## 2023-10-03 ENCOUNTER — APPOINTMENT (OUTPATIENT)
Dept: CT IMAGING | Facility: HOSPITAL | Age: 53
End: 2023-10-03
Payer: COMMERCIAL

## 2023-10-03 ENCOUNTER — HOSPITAL ENCOUNTER (EMERGENCY)
Facility: HOSPITAL | Age: 53
Discharge: HOME | End: 2023-10-04
Attending: EMERGENCY MEDICINE | Admitting: EMERGENCY MEDICINE
Payer: COMMERCIAL

## 2023-10-03 DIAGNOSIS — Z86.79 HISTORY OF ESSENTIAL HYPERTENSION: ICD-10-CM

## 2023-10-03 DIAGNOSIS — R03.0 ELEVATED BLOOD PRESSURE READING: Primary | ICD-10-CM

## 2023-10-03 LAB
ALBUMIN SERPL-MCNC: 4.8 G/DL (ref 3.5–5.2)
ALBUMIN/GLOB SERPL: 1.5 G/DL
ALP SERPL-CCNC: 80 U/L (ref 39–117)
ALT SERPL W P-5'-P-CCNC: 41 U/L (ref 1–41)
ANION GAP SERPL CALCULATED.3IONS-SCNC: 12.2 MMOL/L (ref 5–15)
AST SERPL-CCNC: 24 U/L (ref 1–40)
BASOPHILS # BLD AUTO: 0.22 10*3/MM3 (ref 0–0.2)
BASOPHILS NFR BLD AUTO: 2.1 % (ref 0–1.5)
BILIRUB SERPL-MCNC: 0.3 MG/DL (ref 0–1.2)
BUN SERPL-MCNC: 15 MG/DL (ref 6–20)
BUN/CREAT SERPL: 18.1 (ref 7–25)
CALCIUM SPEC-SCNC: 10.1 MG/DL (ref 8.6–10.5)
CHLORIDE SERPL-SCNC: 99 MMOL/L (ref 98–107)
CO2 SERPL-SCNC: 27.8 MMOL/L (ref 22–29)
CREAT SERPL-MCNC: 0.83 MG/DL (ref 0.76–1.27)
DEPRECATED RDW RBC AUTO: 36.6 FL (ref 37–54)
EGFRCR SERPLBLD CKD-EPI 2021: 105.3 ML/MIN/1.73
EOSINOPHIL # BLD AUTO: 0.83 10*3/MM3 (ref 0–0.4)
EOSINOPHIL NFR BLD AUTO: 7.8 % (ref 0.3–6.2)
ERYTHROCYTE [DISTWIDTH] IN BLOOD BY AUTOMATED COUNT: 12.9 % (ref 12.3–15.4)
GLOBULIN UR ELPH-MCNC: 3.1 GM/DL
GLUCOSE SERPL-MCNC: 143 MG/DL (ref 65–99)
HCT VFR BLD AUTO: 52.5 % (ref 37.5–51)
HGB BLD-MCNC: 17.6 G/DL (ref 13–17.7)
IMM GRANULOCYTES # BLD AUTO: 0.04 10*3/MM3 (ref 0–0.05)
IMM GRANULOCYTES NFR BLD AUTO: 0.4 % (ref 0–0.5)
LYMPHOCYTES # BLD AUTO: 3.01 10*3/MM3 (ref 0.7–3.1)
LYMPHOCYTES NFR BLD AUTO: 28.1 % (ref 19.6–45.3)
MCH RBC QN AUTO: 26.9 PG (ref 26.6–33)
MCHC RBC AUTO-ENTMCNC: 33.5 G/DL (ref 31.5–35.7)
MCV RBC AUTO: 80.3 FL (ref 79–97)
MONOCYTES # BLD AUTO: 0.85 10*3/MM3 (ref 0.1–0.9)
MONOCYTES NFR BLD AUTO: 7.9 % (ref 5–12)
NEUTROPHILS NFR BLD AUTO: 5.75 10*3/MM3 (ref 1.7–7)
NEUTROPHILS NFR BLD AUTO: 53.7 % (ref 42.7–76)
NRBC BLD AUTO-RTO: 0 /100 WBC (ref 0–0.2)
PLATELET # BLD AUTO: 295 10*3/MM3 (ref 140–450)
PMV BLD AUTO: 8.4 FL (ref 6–12)
POTASSIUM SERPL-SCNC: 4.3 MMOL/L (ref 3.5–5.2)
PROT SERPL-MCNC: 7.9 G/DL (ref 6–8.5)
RBC # BLD AUTO: 6.54 10*6/MM3 (ref 4.14–5.8)
SODIUM SERPL-SCNC: 139 MMOL/L (ref 136–145)
TROPONIN T SERPL HS-MCNC: 16 NG/L
WBC NRBC COR # BLD: 10.7 10*3/MM3 (ref 3.4–10.8)

## 2023-10-03 PROCEDURE — 70450 CT HEAD/BRAIN W/O DYE: CPT

## 2023-10-03 PROCEDURE — 96374 THER/PROPH/DIAG INJ IV PUSH: CPT

## 2023-10-03 PROCEDURE — 99284 EMERGENCY DEPT VISIT MOD MDM: CPT

## 2023-10-03 PROCEDURE — 80053 COMPREHEN METABOLIC PANEL: CPT | Performed by: EMERGENCY MEDICINE

## 2023-10-03 PROCEDURE — 25010000002 LABETALOL 5 MG/ML SOLUTION: Performed by: EMERGENCY MEDICINE

## 2023-10-03 PROCEDURE — 85025 COMPLETE CBC W/AUTO DIFF WBC: CPT | Performed by: EMERGENCY MEDICINE

## 2023-10-03 PROCEDURE — 84484 ASSAY OF TROPONIN QUANT: CPT | Performed by: EMERGENCY MEDICINE

## 2023-10-03 RX ORDER — BUTALBITAL, ACETAMINOPHEN AND CAFFEINE 50; 325; 40 MG/1; MG/1; MG/1
2 TABLET ORAL ONCE
Status: COMPLETED | OUTPATIENT
Start: 2023-10-03 | End: 2023-10-03

## 2023-10-03 RX ORDER — LABETALOL HYDROCHLORIDE 5 MG/ML
10 INJECTION, SOLUTION INTRAVENOUS ONCE
Status: COMPLETED | OUTPATIENT
Start: 2023-10-03 | End: 2023-10-03

## 2023-10-03 RX ADMIN — BUTALBITAL, ACETAMINOPHEN, AND CAFFEINE 2 TABLET: 50; 325; 40 TABLET ORAL at 22:53

## 2023-10-03 RX ADMIN — LABETALOL HYDROCHLORIDE 10 MG: 5 INJECTION, SOLUTION INTRAVENOUS at 22:55

## 2023-10-04 VITALS
TEMPERATURE: 97 F | WEIGHT: 250 LBS | RESPIRATION RATE: 16 BRPM | DIASTOLIC BLOOD PRESSURE: 97 MMHG | HEART RATE: 80 BPM | BODY MASS INDEX: 35 KG/M2 | HEIGHT: 71 IN | OXYGEN SATURATION: 94 % | SYSTOLIC BLOOD PRESSURE: 128 MMHG

## 2023-10-04 LAB
GEN 5 2HR TROPONIN T REFLEX: 18 NG/L
TROPONIN T DELTA: 2 NG/L

## 2023-10-04 PROCEDURE — 84484 ASSAY OF TROPONIN QUANT: CPT | Performed by: EMERGENCY MEDICINE

## 2023-10-04 PROCEDURE — 36415 COLL VENOUS BLD VENIPUNCTURE: CPT

## 2023-10-04 RX ORDER — HYDROCHLOROTHIAZIDE 25 MG/1
25 TABLET ORAL DAILY
Qty: 30 TABLET | Refills: 0 | Status: SHIPPED | OUTPATIENT
Start: 2023-10-04

## 2023-10-04 NOTE — ED TRIAGE NOTES
Pt to ED via personal vehicle with complaints of headache and elevated blood pressure for past 2 days. Pt states he's had some blurred vision.

## 2023-10-04 NOTE — DISCHARGE INSTRUCTIONS
Lets increase your hydrochlorothiazide from 12.5 mg to 25 mg given your elevated blood pressure.  Keep a daily diary as described in the attached paperwork.  Follow-up with your family physician in a week's time for repeat evaluation and potential medication adjustments.  Be sure to bring a blood pressure diary to this visit.  I would return to the ER should your blood pressure again become elevated and you have symptoms such as headache, shortness of breath, chest discomfort, or should you have any other general concerns.

## 2023-10-04 NOTE — ED PROVIDER NOTES
MD ATTESTATION NOTE    The KAMILLA and I have discussed this patient's history, physical exam, and treatment plan.  I have reviewed the documentation and personally had a face to face interaction with the patient. I affirm the documentation and agree with the treatment and plan.  The attached note describes my personal findings.      I provided a substantive portion of the care of the patient.  I personally performed the physical exam in its entirety, and below are my findings.        Brief HPI: This patient is a 53-year-old male with a history of hypertension treated with hydrochlorothiazide presenting to the emergency room today with a headache that he says is moderate and global.  He reports that this is associated with elevations in his blood pressure noted today.  He denies fever/chills, neck pain, chest pain, or shortness of breath.      PHYSICAL EXAM  ED Triage Vitals   Temp Heart Rate Resp BP SpO2   10/03/23 2226 10/03/23 2225 10/03/23 2225 10/03/23 2233 10/03/23 2225   97 °F (36.1 °C) 101 16 (!) 172/118 96 %      Temp src Heart Rate Source Patient Position BP Location FiO2 (%)   10/03/23 2226 -- -- -- --   Tympanic           GENERAL: Resting comfortably and in no acute distress, nontoxic in appearance  HENT: nares patent  EYES: no scleral icterus  CV: regular rhythm, normal rate, no M/R/G  RESPIRATORY: normal effort, lungs clear bilaterally  ABDOMEN: soft, nontender, no rebound or guarding  MUSCULOSKELETAL: no deformity, no edema  NEURO: alert, moves all extremities, follows commands  PSYCH:  calm, cooperative  SKIN: warm, dry    Vital signs and nursing notes reviewed.        Plan: We will treat his headache and hypertension with oral medications in the emergency room today as we obtain labs as well as a head CT.  We will monitor and reassess following results.       Julio Prescott MD  10/04/23 0215

## 2023-10-04 NOTE — ED PROVIDER NOTES
EMERGENCY DEPARTMENT ENCOUNTER    Room Number:  13/13  PCP: Savanna Dang APRN      HPI:  Chief Complaint: Hypertension/headache  A complete HPI/ROS/PMH/PSH/SH/FH are unobtainable due to: Nothing  Context: Georgi Khanna is a 53 y.o. male who presents to the ED c/o hypertension and headache.  Symptoms and sentiment ongoing for the past 2 days.  Patient does report some intermittent blurred vision.  Headache is said to be moderate in nature and global.  It is described as a tight throbbing sensation.  Nothing makes it better or worse.  He denies neck pain, fever, chills, numbness, tingling, weakness, ataxia, aphasia, dysarthria, chest pain, shortness of breath, abdominal pain associated with his elevated blood pressure.  He notes he does have a history of hypertension.  He appears to only be on HCTZ 12.5 mg at this time.      Patient states he does not have a past medical history of headaches and this headache is new.    PAST MEDICAL HISTORY  Active Ambulatory Problems     Diagnosis Date Noted    Asthma 05/02/2016    Morbid obesity due to excess calories 05/02/2016    Snoring 05/02/2016    Daytime somnolence 05/02/2016    Vitamin D deficiency 06/06/2016    Hyperlipidemia 06/06/2016    Type 2 diabetes mellitus, without long-term current use of insulin 06/06/2016    Elevated ALT measurement 12/12/2016    Essential hypertension 07/29/2019    Incomplete bladder emptying 12/10/2019    Encounter for screening colonoscopy 12/28/2021     Resolved Ambulatory Problems     Diagnosis Date Noted    Elevated blood pressure reading 05/02/2016     Past Medical History:   Diagnosis Date    Pneumonia 1993         PAST SURGICAL HISTORY  Past Surgical History:   Procedure Laterality Date    COLONOSCOPY N/A 4/25/2022    Procedure: COLONOSCOPY;  Surgeon: Shakir Baca MD;  Location: [facility] [place];  Service: Gastroenterology;  Laterality: N/A;  hemorrhoids, diverticulosis    UMBILICAL HERNIA REPAIR           FAMILY HISTORY  Family  History   Problem Relation Age of Onset    Stroke Mother         40s    Failure to thrive Mother     Failure to thrive Father     Hypertension Father     Heart attack Father         in his 50s, [employer] BYPASS sx    Sleep apnea Father     Hyperlipidemia Father     Stroke Father     Seizures Father     Obesity Brother     Hypertension Brother     Heart attack Paternal Grandfather          in his 50s    No Known Problems Son          SOCIAL HISTORY  Social History     Socioeconomic History    Marital status:      Spouse name: LYNNE    Tobacco Use    Smoking status: Former     Packs/day: 0.25     Years: 3.00     Pack years: 0.75     Types: Cigarettes     Start date: 6/3/1986     Quit date: 1989     Years since quittin.3    Smokeless tobacco: Former     Types: Chew    Tobacco comments:     age [age] about 5 years   Vaping Use    Vaping Use: Never used   Substance and Sexual Activity    Alcohol use: Not Currently     Comment: once monthly    Drug use: No    Sexual activity: Yes     Partners: Female     Birth control/protection: Surgical         ALLERGIES  Patient has no known allergies.        REVIEW OF SYSTEMS  Review of Systems     All systems reviewed and negative except for those discussed in HPI.       PHYSICAL EXAM  ED Triage Vitals   Temp Heart Rate Resp BP SpO2   10/03/23 2226 10/03/23 2225 10/03/23 2225 -- 10/03/23 2225   97 °F (36.1 °C) 101 16  96 %      Temp src Heart Rate Source Patient Position BP Location FiO2 (%)   10/03/23 2226 -- -- -- --   Tympanic           Physical Exam      GENERAL: WDWN male, no acute distress  HENT: nares patent, face symmetric, mucous remains moist  EYES: no scleral icterus, pupils equal bilateral  CV: regular rhythm, normal rate, no murmur.  /118.  Heart rate 98 at bedside  RESPIRATORY: normal effort, lungs CTA B, SPO2 98% at bedside  ABDOMEN: soft, nontender  MUSCULOSKELETAL: no deformity  NEURO: alert orient x4, moves all extremities, follows  commands  PSYCH:  calm, cooperative  SKIN: warm, dry    Vital signs and nursing notes reviewed.          LAB RESULTS  Recent Results (from the past 24 hour(s))   Comprehensive Metabolic Panel    Collection Time: 10/03/23 10:39 PM    Specimen: Blood   Result Value Ref Range    Glucose 143 (H) 65 - 99 mg/dL    BUN 15 6 - 20 mg/dL    Creatinine 0.83 0.76 - 1.27 mg/dL    Sodium 139 136 - 145 mmol/L    Potassium 4.3 3.5 - 5.2 mmol/L    Chloride 99 98 - 107 mmol/L    CO2 27.8 22.0 - 29.0 mmol/L    Calcium 10.1 8.6 - 10.5 mg/dL    Total Protein 7.9 6.0 - 8.5 g/dL    Albumin 4.8 3.5 - 5.2 g/dL    ALT (SGPT) 41 1 - 41 U/L    AST (SGOT) 24 1 - 40 U/L    Alkaline Phosphatase 80 39 - 117 U/L    Total Bilirubin 0.3 0.0 - 1.2 mg/dL    Globulin 3.1 gm/dL    A/G Ratio 1.5 g/dL    BUN/Creatinine Ratio 18.1 7.0 - 25.0    Anion Gap 12.2 5.0 - 15.0 mmol/L    eGFR 105.3 >60.0 mL/min/1.73   High Sensitivity Troponin T    Collection Time: 10/03/23 10:39 PM    Specimen: Blood   Result Value Ref Range    HS Troponin T 16 (H) <15 ng/L   CBC Auto Differential    Collection Time: 10/03/23 10:39 PM    Specimen: Blood   Result Value Ref Range    WBC 10.70 3.40 - 10.80 10*3/mm3    RBC 6.54 (H) 4.14 - 5.80 10*6/mm3    Hemoglobin 17.6 13.0 - 17.7 g/dL    Hematocrit 52.5 (H) 37.5 - 51.0 %    MCV 80.3 79.0 - 97.0 fL    MCH 26.9 26.6 - 33.0 pg    MCHC 33.5 31.5 - 35.7 g/dL    RDW 12.9 12.3 - 15.4 %    RDW-SD 36.6 (L) 37.0 - 54.0 fl    MPV 8.4 6.0 - 12.0 fL    Platelets 295 140 - 450 10*3/mm3    Neutrophil % 53.7 42.7 - 76.0 %    Lymphocyte % 28.1 19.6 - 45.3 %    Monocyte % 7.9 5.0 - 12.0 %    Eosinophil % 7.8 (H) 0.3 - 6.2 %    Basophil % 2.1 (H) 0.0 - 1.5 %    Immature Grans % 0.4 0.0 - 0.5 %    Neutrophils, Absolute 5.75 1.70 - 7.00 10*3/mm3    Lymphocytes, Absolute 3.01 0.70 - 3.10 10*3/mm3    Monocytes, Absolute 0.85 0.10 - 0.90 10*3/mm3    Eosinophils, Absolute 0.83 (H) 0.00 - 0.40 10*3/mm3    Basophils, Absolute 0.22 (H) 0.00 - 0.20 10*3/mm3     Immature Grans, Absolute 0.04 0.00 - 0.05 10*3/mm3    nRBC 0.0 0.0 - 0.2 /100 WBC   High Sensitivity Troponin T 2Hr    Collection Time: 10/04/23 12:35 AM    Specimen: Blood   Result Value Ref Range    HS Troponin T 18 (H) <15 ng/L    Troponin T Delta 2 >=-4 - <+4 ng/L       Ordered the above labs and reviewed the results.        RADIOLOGY  CT Head Without Contrast    Result Date: 10/3/2023  CT OF THE HEAD WITHOUT CONTRAST  HISTORY: New onset headache  COMPARISON: None available.  TECHNIQUE: Axial CT imaging was obtained through the brain. No IV contrast was administered.  FINDINGS: No acute intracranial hemorrhage is seen. The ventricles are normal in size. There is no midline shift or mass effect. There is mucosal thickening noted within the ethmoid sinuses. Mastoid air cells are clear.      No acute intracranial findings.  Radiation dose reduction techniques were utilized, including automated exposure control and exposure modulation based on body size.   This report was finalized on 10/3/2023 11:58 PM by Dr. Lacy Saravia M.D on Workstation: [id number][id number]       Ordered the above noted radiological studies. Reviewed by me in PACS.          PROCEDURES  Procedures          MEDICATIONS GIVEN IN ER  Medications   labetalol (NORMODYNE,TRANDATE) injection 10 mg (10 mg Intravenous Given 10/3/23 2255)   butalbital-acetaminophen-caffeine (FIORICET, ESGIC) -40 MG per tablet 2 tablet (2 tablets Oral Given 10/3/23 2253)         MEDICAL DECISION MAKING, PROGRESS, and CONSULTS    Discussion below represents my analysis of pertinent findings related to patient's condition, differential diagnosis, treatment plan and final disposition.      Orders placed during this visit:  Orders Placed This Encounter   Procedures    CT Head Without Contrast    Comprehensive Metabolic Panel    High Sensitivity Troponin T    CBC Auto Differential    High Sensitivity Troponin T 2Hr    SCANNED - TELEMETRY      SCANNED - TELEMETRY      CBC &  Differential         Additional sources:  - Discussed/obtained information from independent historians: None available  Additional information was obtained to confirm the patient's history.    - External (non-ED) record review: Reviewing the patient's chart he appears to be followed by CHERYLE Dang.  He was last seen 12/12/2022 which appears to have been for a follow-up for pneumonia, moderate persistent asthma, flu.  It was noted patient was on Dulera at this visit.  Repeat chest x-ray was discussed for future.            - Chronic or social conditions impacting care: None        Differential diagnosis:    Random elevated blood pressure readings, uncontrolled hypertension, tension headache, hypertensive headache, ICH.  Will obtain CBC, CMP, troponin x2 and given the reported new onset headache a CT of the head without contrast to further evaluate.            Independent interpretation of labs, radiology studies, and discussions with consultants:  ED Course as of 10/04/23 0304   Wed Oct 04, 2023   0025 CT head shows no acute process as interpreted by myself and the radiologist.  CBC, CMP, EKG unremarkable.  First troponin 16.  The patient is having no chest pain per se will obtain a second troponin to ensure this is stable prior to discharge [RC]   0135 HS Troponin T(!): 18 [RC]   0135 Second troponin relatively unchanged.  Again patient not complaining of chest pain.  Blood pressure significantly improved [RC]   0137 Work-up unremarkable.  Patient feeling much better.  Blood pressure came down nicely with 10 mg of labetalol.  It is currently 128/97.  Plan to increase patient's HCTZ from 12.5 mg daily to 25 mg daily.  We will have him to keep a blood pressure diary follow-up with his family physician for further management.  We will have him return to the ED with recurrent elevated blood pressure should he have symptoms such as headache, chest pain, shortness of breath, etc. [RC]      ED Course User Index  [RC]  Evan Hong III, PA               DIAGNOSIS  Final diagnoses:   Elevated blood pressure reading   History of essential hypertension         DISPOSITION  DISCHARGE    Patient discharged in stable condition.    Reviewed implications of results, diagnosis, meds, responsibility to follow up, warning signs and symptoms of possible worsening, potential complications and reasons to return to ER.    Patient/Family voiced understanding of above instructions.    Discussed plan for discharge, as there is no emergent indication for admission. Patient referred to primary care provider for BP management due to today's BP. Pt/family is agreeable and understands need for follow up and repeat testing.  Pt is aware that discharge does not mean that nothing is wrong but it indicates no emergency is present that requires admission and they must continue care with follow-up as given below or physician of their choice.     FOLLOW-UP  Savanna Dang, APRN  [address]  [place] [id number] [phone]    Schedule an appointment as soon as possible for a visit   For further evaluation and treatment         Medication List        Changed      hydroCHLOROthiazide 25 MG tablet  Commonly known as: HYDRODIURIL  Take 1 tablet by mouth Daily.  What changed:   medication strength  how much to take               Where to Get Your Medications        These medications were sent to [facility]/pharmacy #[id number] - [place], [place] - [address] [address] AT [facility] - [phone]  - [phone] fx [address] [address], [place] [id number]      Phone: [phone]   hydroCHLOROthiazide 25 MG tablet            Latest Documented Vital Signs:  As of 03:04 EDT  BP- 128/97 HR- 80 Temp- 97 °F (36.1 °C) (Tympanic) O2 sat- 94%      --    Please note that portions of this were completed with a voice recognition program.       Note Disclaimer: At [facility], we believe that sharing information builds trust and better  relationships. You are receiving this note because you are receiving care at [facility] or recently visited. It is possible you will see health information before a provider has talked with you about it. This kind of information can be easy to misunderstand. To help you fully understand what it means for your health, we urge you to discuss this note with your provider.         Evan Hong III, PA  10/04/23 0304

## 2023-10-09 ENCOUNTER — TELEMEDICINE (OUTPATIENT)
Dept: INTERNAL MEDICINE | Facility: CLINIC | Age: 53
End: 2023-10-09
Payer: COMMERCIAL

## 2023-10-09 DIAGNOSIS — E11.9 TYPE 2 DIABETES MELLITUS WITHOUT COMPLICATION, WITHOUT LONG-TERM CURRENT USE OF INSULIN: ICD-10-CM

## 2023-10-09 DIAGNOSIS — I10 ESSENTIAL HYPERTENSION: Primary | ICD-10-CM

## 2023-10-09 DIAGNOSIS — E78.5 HYPERLIPIDEMIA, UNSPECIFIED HYPERLIPIDEMIA TYPE: ICD-10-CM

## 2023-10-09 DIAGNOSIS — E55.9 VITAMIN D DEFICIENCY: ICD-10-CM

## 2023-10-09 PROCEDURE — 99214 OFFICE O/P EST MOD 30 MIN: CPT | Performed by: NURSE PRACTITIONER

## 2023-10-09 RX ORDER — LISINOPRIL 10 MG/1
10 TABLET ORAL DAILY
Qty: 90 TABLET | Refills: 1 | Status: SHIPPED | OUTPATIENT
Start: 2023-10-09

## 2023-10-09 NOTE — PROGRESS NOTES
Subjective   Georgi Khanna is a 53 y.o. male.     History of Present Illness    The patient is here today to f/u on ER visit 10/3 to [facility] with c/o headache and HTN.   Was seen in ER, given labetolol with BP improvement. HCTZ increased from 12.5 to 25 mg daily. CT head was neg. Troponin was elevated, 2nd one was stable, no chest pain.   He has been working a lot, he did not feel good and had a bad headache.     BP this am 130/90. This is typical since the ER visit.    Last visit 12/2022, BP WNL.     This is his busy work season.   The following portions of the patient's history were reviewed and updated as appropriate: allergies, current medications, past family history, past medical history, past social history, past surgical history and problem list.    Review of Systems   Constitutional: Negative.    Respiratory: Negative.     Cardiovascular: Negative.    Neurological:  Negative for headache.       Objective   Physical Exam  Constitutional:       Appearance: Normal appearance.   Cardiovascular:      Rate and Rhythm: Normal rate.   Neurological:      Mental Status: He is alert.   Psychiatric:         Mood and Affect: Mood normal.         Behavior: Behavior normal.         Thought Content: Thought content normal.         Judgment: Judgment normal.         There were no vitals filed for this visit.  There is no height or weight on file to calculate BMI.      Assessment & Plan   Diagnoses and all orders for this visit:    1. Essential hypertension (Primary)  -     Comprehensive Metabolic Panel; Future  -     Hemoglobin A1c; Future  -     Lipid Panel With LDL / HDL Ratio; Future  -     TSH Rfx On Abnormal To Free T4; Future  -     Vitamin D,25-Hydroxy; Future    2. Type 2 diabetes mellitus without complication, without long-term current use of insulin  -     Comprehensive Metabolic Panel; Future  -     Hemoglobin A1c; Future  -     Lipid Panel With LDL / HDL Ratio; Future  -     TSH Rfx On Abnormal To Free T4;  Future  -     Vitamin D,25-Hydroxy; Future    3. Hyperlipidemia, unspecified hyperlipidemia type  -     Comprehensive Metabolic Panel; Future  -     Hemoglobin A1c; Future  -     Lipid Panel With LDL / HDL Ratio; Future  -     TSH Rfx On Abnormal To Free T4; Future  -     Vitamin D,25-Hydroxy; Future    4. Vitamin D deficiency  -     Comprehensive Metabolic Panel; Future  -     Hemoglobin A1c; Future  -     Lipid Panel With LDL / HDL Ratio; Future  -     TSH Rfx On Abnormal To Free T4; Future  -     Vitamin D,25-Hydroxy; Future    Other orders  -     lisinopril (PRINIVIL,ZESTRIL) 10 MG tablet; Take 1 tablet by mouth Daily.  Dispense: 90 tablet; Refill: 1      Visit via video, lasted 15 minutes, pt consents. Pt and I are both at our homes.            1. HTN- still too high, add on lisinopril 10 mg PO daily, recheck in office with lab wok in 2 wks. Check BP and notify for syst <110 or >135 or diast>85

## 2023-10-10 ENCOUNTER — TELEPHONE (OUTPATIENT)
Dept: INTERNAL MEDICINE | Facility: CLINIC | Age: 53
End: 2023-10-10
Payer: COMMERCIAL

## 2023-12-12 DIAGNOSIS — J45.998 UNCONTROLLED PERSISTENT ASTHMA: ICD-10-CM

## 2023-12-12 RX ORDER — ALBUTEROL SULFATE 90 UG/1
2 AEROSOL, METERED RESPIRATORY (INHALATION) EVERY 4 HOURS PRN
Qty: 18 G | Refills: 0 | Status: SHIPPED | OUTPATIENT
Start: 2023-12-12

## 2023-12-12 NOTE — TELEPHONE ENCOUNTER
Caller: Georgi Khanna    Relationship: Self    Best call back number: [phone]    Requested Prescriptions:   Requested Prescriptions     Pending Prescriptions Disp Refills    albuterol sulfate  (90 Base) MCG/ACT inhaler 18 g 0     Sig: Inhale 2 puffs Every 4 (Four) Hours As Needed for Wheezing or Shortness of Air.        Pharmacy where request should be sent: [facility]/PHARMACY #[id number] [place], [place] - [address] [address] AT [facility] [phone] [facility] [phone]      Last office visit with prescribing clinician: 12/21/2022   Last telemedicine visit with prescribing clinician: 10/9/2023   Next office visit with prescribing clinician: 1/4/2024     Additional details provided by patient: OUT OF REFILLS , WILL NEED CALLED IN AND REFILLS ATTACHED     Does the patient have less than a 3 day supply:  [x] Yes  [] No    Would you like a call back once the refill request has been completed: [] Yes [x] No    If the office needs to give you a call back, can they leave a voicemail: [] Yes [x] No    Yeison Hardy   12/12/23 13:02 EST

## 2024-01-04 ENCOUNTER — OFFICE VISIT (OUTPATIENT)
Dept: INTERNAL MEDICINE | Facility: CLINIC | Age: 54
End: 2024-01-04
Payer: COMMERCIAL

## 2024-01-04 VITALS
BODY MASS INDEX: 37.22 KG/M2 | SYSTOLIC BLOOD PRESSURE: 110 MMHG | HEIGHT: 70 IN | WEIGHT: 260 LBS | HEART RATE: 115 BPM | DIASTOLIC BLOOD PRESSURE: 84 MMHG | OXYGEN SATURATION: 98 %

## 2024-01-04 DIAGNOSIS — E55.9 VITAMIN D DEFICIENCY: ICD-10-CM

## 2024-01-04 DIAGNOSIS — E78.5 HYPERLIPIDEMIA, UNSPECIFIED HYPERLIPIDEMIA TYPE: ICD-10-CM

## 2024-01-04 DIAGNOSIS — I10 ESSENTIAL HYPERTENSION: ICD-10-CM

## 2024-01-04 DIAGNOSIS — E11.65 TYPE 2 DIABETES MELLITUS WITH HYPERGLYCEMIA, WITHOUT LONG-TERM CURRENT USE OF INSULIN: Primary | ICD-10-CM

## 2024-01-04 DIAGNOSIS — J45.21 MILD INTERMITTENT ASTHMA WITH EXACERBATION: ICD-10-CM

## 2024-01-04 DIAGNOSIS — J45.41 MODERATE PERSISTENT ASTHMA WITH ACUTE EXACERBATION: ICD-10-CM

## 2024-01-04 PROCEDURE — 99214 OFFICE O/P EST MOD 30 MIN: CPT | Performed by: NURSE PRACTITIONER

## 2024-01-04 RX ORDER — FLUTICASONE PROPIONATE AND SALMETEROL 250; 50 UG/1; UG/1
1 POWDER RESPIRATORY (INHALATION)
Qty: 60 EACH | Refills: 5 | Status: SHIPPED | OUTPATIENT
Start: 2024-01-04

## 2024-01-04 RX ORDER — MONTELUKAST SODIUM 10 MG/1
10 TABLET ORAL NIGHTLY
Qty: 90 TABLET | Refills: 3 | Status: SHIPPED | OUTPATIENT
Start: 2024-01-04

## 2024-01-04 RX ORDER — HYDROCHLOROTHIAZIDE 25 MG/1
25 TABLET ORAL DAILY
Qty: 90 TABLET | Refills: 1 | Status: SHIPPED | OUTPATIENT
Start: 2024-01-04

## 2024-01-04 RX ORDER — LISINOPRIL 10 MG/1
10 TABLET ORAL DAILY
Qty: 90 TABLET | Refills: 1 | Status: SHIPPED | OUTPATIENT
Start: 2024-01-04

## 2024-01-04 RX ORDER — ATORVASTATIN CALCIUM 20 MG/1
20 TABLET, FILM COATED ORAL NIGHTLY
Qty: 90 TABLET | Refills: 1 | Status: SHIPPED | OUTPATIENT
Start: 2024-01-04

## 2024-01-04 NOTE — PROGRESS NOTES
Subjective   Georgi Khanna is a 53 y.o. male.      History of Present Illness   The patient is here today to F/U on lab work. Feeling well now. Has been sick a lot with his asthma not being well controlled.     HTN- Pt is doing well with current medication regimen, denies adverse reactions, compliant with medication schedule.   DM2- off trulicity for at least 8 months, unsure why. On montelukast   Asthma- not on any asthma meds, was approved but was > $1000.00 a month,    The following portions of the patient's history were reviewed and updated as appropriate: allergies, current medications, past family history, past medical history, past social history, past surgical history and problem list.    Review of Systems    Objective   Physical Exam  Constitutional:       Appearance: Normal appearance. He is well-developed.   Neck:      Thyroid: No thyromegaly.   Cardiovascular:      Rate and Rhythm: Normal rate and regular rhythm.      Heart sounds: Normal heart sounds.   Pulmonary:      Effort: Pulmonary effort is normal.      Breath sounds: Examination of the right-upper field reveals wheezing. Examination of the left-upper field reveals wheezing. Examination of the right-middle field reveals wheezing. Examination of the left-middle field reveals wheezing. Examination of the right-lower field reveals wheezing. Examination of the left-lower field reveals wheezing. Wheezing present.   Musculoskeletal:      Cervical back: Normal range of motion and neck supple.   Lymphadenopathy:      Cervical: No cervical adenopathy.   Skin:     General: Skin is warm and dry.   Neurological:      Mental Status: He is alert.   Psychiatric:         Behavior: Behavior normal.         Thought Content: Thought content normal.         Judgment: Judgment normal.         Vitals:    01/04/24 1352   BP: 110/84   Pulse: 115   SpO2: 98%     Body mass index is 37.31 kg/m².      Current Outpatient Medications:     albuterol sulfate  (90 Base)  MCG/ACT inhaler, Inhale 2 puffs Every 4 (Four) Hours As Needed for Wheezing or Shortness of Air., Disp: 18 g, Rfl: 0    hydroCHLOROthiazide (HYDRODIURIL) 25 MG tablet, Take 1 tablet by mouth Daily., Disp: 90 tablet, Rfl: 1    lisinopril (PRINIVIL,ZESTRIL) 10 MG tablet, Take 1 tablet by mouth Daily., Disp: 90 tablet, Rfl: 1    montelukast (SINGULAIR) 10 MG tablet, Take 1 tablet by mouth Every Night., Disp: 90 tablet, Rfl: 3    atorvastatin (Lipitor) 20 MG tablet, Take 1 tablet by mouth Every Night., Disp: 90 tablet, Rfl: 1    Fluticasone-Salmeterol (Wixela Inhub) 250-50 MCG/ACT DISKUS, Inhale 1 puff 2 (Two) Times a Day., Disp: 60 each, Rfl: 5    Tirzepatide (Mounjaro) 2.5 MG/0.5ML solution pen-injector pen, Inject 0.5 mL under the skin into the appropriate area as directed 1 (One) Time Per Week., Disp: 2 mL, Rfl: 0   Results Encounter on 10/30/2023   Component Date Value Ref Range Status    Glucose 12/28/2023 142 (H)  65 - 99 mg/dL Final    BUN 12/28/2023 21 (H)  6 - 20 mg/dL Final    Creatinine 12/28/2023 0.82  0.76 - 1.27 mg/dL Final    EGFR Result 12/28/2023 105.7  >60.0 mL/min/1.73 Final    Comment: GFR Normal >60  Chronic Kidney Disease <60  Kidney Failure <15      BUN/Creatinine Ratio 12/28/2023 25.6 (H)  7.0 - 25.0 Final    Sodium 12/28/2023 135 (L)  136 - 145 mmol/L Final    Potassium 12/28/2023 4.5  3.5 - 5.2 mmol/L Final    Chloride 12/28/2023 96 (L)  98 - 107 mmol/L Final    Total CO2 12/28/2023 28.7  22.0 - 29.0 mmol/L Final    Calcium 12/28/2023 9.1  8.6 - 10.5 mg/dL Final    Total Protein 12/28/2023 6.9  6.0 - 8.5 g/dL Final    Albumin 12/28/2023 4.3  3.5 - 5.2 g/dL Final    Globulin 12/28/2023 2.6  gm/dL Final    A/G Ratio 12/28/2023 1.7  g/dL Final    Total Bilirubin 12/28/2023 0.4  0.0 - 1.2 mg/dL Final    Alkaline Phosphatase 12/28/2023 59  39 - 117 U/L Final    AST (SGOT) 12/28/2023 10  1 - 40 U/L Final    ALT (SGPT) 12/28/2023 23  1 - 41 U/L Final    Hemoglobin A1C 12/28/2023 7.20 (H)  4.80 - 5.60  % Final    Comment: Hemoglobin A1C Ranges:  Increased Risk for Diabetes  5.7% to 6.4%  Diabetes                     >= 6.5%  Diabetic Goal                < 7.0%      Total Cholesterol 12/28/2023 216 (H)  0 - 200 mg/dL Final    Comment: Cholesterol Reference Ranges  (U.S. Department of Health and Human Services ATP III  Classifications)  Desirable          <200 mg/dL  Borderline High    200-239 mg/dL  High Risk          >240 mg/dL  Triglyceride Reference Ranges  (U.S. Department of Health and Human Services ATP III  Classifications)  Normal           <150 mg/dL  Borderline High  150-199 mg/dL  High             200-499 mg/dL  Very High        >500 mg/dL  HDL Reference Ranges  (U.S. Department of Health and Human Services ATP III  Classifications)  Low     <40 mg/dl (major risk factor for CHD)  High    >60 mg/dl ('negative' risk factor for CHD)  LDL Reference Ranges  (U.S. Department of Health and Human Services ATP III  Classifications)  Optimal          <100 mg/dL  Near Optimal     100-129 mg/dL  Borderline High  130-159 mg/dL  High             160-189 mg/dL  Very High        >189 mg/dL      Triglycerides 12/28/2023 165 (H)  0 - 150 mg/dL Final    HDL Cholesterol 12/28/2023 53  40 - 60 mg/dL Final    VLDL Cholesterol Buzz 12/28/2023 29  5 - 40 mg/dL Final    LDL Chol Calc (NIH) 12/28/2023 134 (H)  0 - 100 mg/dL Final    LDL/HDL RATIO 12/28/2023 2.45   Final    TSH 12/28/2023 1.840  0.270 - 4.200 uIU/mL Final    25 Hydroxy, Vitamin D 12/28/2023 20.3 (L)  30.0 - 100.0 ng/ml Final    Comment: Reference Range for Total Vitamin D 25(OH)  Deficiency <20.0 ng/mL  Insufficiency 21-29 ng/mL  Sufficiency  ng/mL  Toxicity >100 ng/ml        Assessment & Plan   Diagnoses and all orders for this visit:    1. Type 2 diabetes mellitus with hyperglycemia, without long-term current use of insulin (Primary)  -     Tirzepatide (Mounjaro) 2.5 MG/0.5ML solution pen-injector pen; Inject 0.5 mL under the skin into the appropriate area  as directed 1 (One) Time Per Week.  Dispense: 2 mL; Refill: 0  -     Comprehensive Metabolic Panel; Future  -     Hepatic Function Panel; Future  -     Hemoglobin A1c; Future  -     Vitamin D,25-Hydroxy; Future    2. Mild intermittent asthma with exacerbation  -     montelukast (SINGULAIR) 10 MG tablet; Take 1 tablet by mouth Every Night.  Dispense: 90 tablet; Refill: 3  -     Fluticasone-Salmeterol (Wixela Inhub) 250-50 MCG/ACT DISKUS; Inhale 1 puff 2 (Two) Times a Day.  Dispense: 60 each; Refill: 5    3. Essential hypertension  -     Comprehensive Metabolic Panel; Future  -     Hepatic Function Panel; Future  -     Hemoglobin A1c; Future  -     Vitamin D,25-Hydroxy; Future    4. Moderate persistent asthma with acute exacerbation    5. Hyperlipidemia, unspecified hyperlipidemia type  -     atorvastatin (Lipitor) 20 MG tablet; Take 1 tablet by mouth Every Night.  Dispense: 90 tablet; Refill: 1  -     Comprehensive Metabolic Panel; Future  -     Hepatic Function Panel; Future  -     Hemoglobin A1c; Future  -     Vitamin D,25-Hydroxy; Future    6. Vitamin D deficiency  -     Comprehensive Metabolic Panel; Future  -     Hepatic Function Panel; Future  -     Hemoglobin A1c; Future  -     Vitamin D,25-Hydroxy; Future    Other orders  -     lisinopril (PRINIVIL,ZESTRIL) 10 MG tablet; Take 1 tablet by mouth Daily.  Dispense: 90 tablet; Refill: 1  -     hydroCHLOROthiazide (HYDRODIURIL) 25 MG tablet; Take 1 tablet by mouth Daily.  Dispense: 90 tablet; Refill: 1               1. DM2- start on mounjaro, A1C not controlled, low carb diet, diabetes education classes   2. HTN- controlled, continue   3. Asthma- will try wixella, continue montelukast, given sample of trelegy today, he will call and re schedule sleep eval   4. HPL- suggest lipitor 20 mg daily, check labs in 6 weeks   5. Vit D def- double current intake

## 2024-01-04 NOTE — PATIENT INSTRUCTIONS
1. DM2- start on mounjaro, A1C not controlled, low carb diet, diabetes education classes   2. HTN- controlled, continue   3. Asthma- will try wixella, continue montelukast, given sample of trelegy today, he will call and re schedule sleep eval   4. HPL- suggest lipitor 20 mg daily, check labs in 6 weeks   5. Vit D def- double current intake

## 2024-01-10 DIAGNOSIS — J45.998 UNCONTROLLED PERSISTENT ASTHMA: ICD-10-CM

## 2024-01-10 RX ORDER — ALBUTEROL SULFATE 90 UG/1
2 AEROSOL, METERED RESPIRATORY (INHALATION) EVERY 4 HOURS PRN
Qty: 18 G | Refills: 0 | Status: SHIPPED | OUTPATIENT
Start: 2024-01-10 | End: 2024-01-12 | Stop reason: SDUPTHER

## 2024-01-11 ENCOUNTER — OFFICE VISIT (OUTPATIENT)
Dept: INTERNAL MEDICINE | Facility: CLINIC | Age: 54
End: 2024-01-11
Payer: COMMERCIAL

## 2024-01-11 VITALS
TEMPERATURE: 98.5 F | HEIGHT: 70 IN | OXYGEN SATURATION: 94 % | HEART RATE: 99 BPM | DIASTOLIC BLOOD PRESSURE: 80 MMHG | WEIGHT: 260.8 LBS | BODY MASS INDEX: 37.34 KG/M2 | SYSTOLIC BLOOD PRESSURE: 122 MMHG

## 2024-01-11 DIAGNOSIS — J40 BRONCHITIS: Primary | ICD-10-CM

## 2024-01-11 DIAGNOSIS — R05.9 COUGH, UNSPECIFIED TYPE: ICD-10-CM

## 2024-01-11 LAB
EXPIRATION DATE: NORMAL
FLUAV AG UPPER RESP QL IA.RAPID: NOT DETECTED
FLUBV AG UPPER RESP QL IA.RAPID: NOT DETECTED
INTERNAL CONTROL: NORMAL
Lab: NORMAL
SARS-COV-2 AG UPPER RESP QL IA.RAPID: NOT DETECTED

## 2024-01-11 PROCEDURE — 87428 SARSCOV & INF VIR A&B AG IA: CPT | Performed by: NURSE PRACTITIONER

## 2024-01-11 PROCEDURE — 99213 OFFICE O/P EST LOW 20 MIN: CPT | Performed by: NURSE PRACTITIONER

## 2024-01-11 RX ORDER — AMOXICILLIN AND CLAVULANATE POTASSIUM 875; 125 MG/1; MG/1
1 TABLET, FILM COATED ORAL 2 TIMES DAILY
Qty: 20 TABLET | Refills: 0 | Status: SHIPPED | OUTPATIENT
Start: 2024-01-11 | End: 2024-01-12 | Stop reason: SDUPTHER

## 2024-01-11 RX ORDER — ALBUTEROL SULFATE 90 UG/1
2 AEROSOL, METERED RESPIRATORY (INHALATION) EVERY 4 HOURS PRN
Qty: 18 G | Refills: 1 | Status: SHIPPED | OUTPATIENT
Start: 2024-01-11

## 2024-01-11 RX ORDER — HYDROCODONE POLISTIREX AND CHLORPHENIRAMINE POLISTIREX 10; 8 MG/5ML; MG/5ML
5 SUSPENSION, EXTENDED RELEASE ORAL EVERY 12 HOURS PRN
Qty: 180 ML | Refills: 0 | Status: SHIPPED | OUTPATIENT
Start: 2024-01-11 | End: 2024-01-12 | Stop reason: SDUPTHER

## 2024-01-11 NOTE — PROGRESS NOTES
Subjective   Georgi Khanna is a 53 y.o. male. Patient is here today for   Chief Complaint   Patient presents with    Cough    Nasal Congestion   .    History of Present Illness   C/o cough for several days associated with chest and nasal congestion. Cough is productive. Denies fever. Some wheezing.   He has asthma and started a new inhaler recently. He has taken Mucinex with some relief. Cough is worse at night and prevents him from sleeping     The following portions of the patient's history were reviewed and updated as appropriate: allergies, current medications, past family history, past medical history, past social history, past surgical history and problem list.    Review of Systems    Objective   Vitals:    01/11/24 0732   BP: 122/80   Pulse: 99   Temp: 98.5 °F (36.9 °C)   SpO2: 94%     Body mass index is 37.42 kg/m².  Physical Exam  Vitals and nursing note reviewed.   Constitutional:       Appearance: Normal appearance. He is well-developed.   HENT:      Right Ear: Ear canal normal. A middle ear effusion is present.      Left Ear: Tympanic membrane and ear canal normal.      Mouth/Throat:      Pharynx: Oropharynx is clear.   Cardiovascular:      Rate and Rhythm: Normal rate and regular rhythm.      Heart sounds: Normal heart sounds.   Pulmonary:      Effort: Pulmonary effort is normal.      Breath sounds: Wheezing present.   Skin:     General: Skin is warm and dry.   Neurological:      Mental Status: He is alert and oriented to person, place, and time.   Psychiatric:         Speech: Speech normal.         Behavior: Behavior normal.         Thought Content: Thought content normal.       Results for orders placed or performed in visit on 01/11/24   POCT SARS-CoV-2 + Flu Antigen BALA    Specimen: Swab   Result Value Ref Range    SARS Antigen Not Detected Not Detected, Presumptive Negative    Influenza A Antigen BALA Not Detected Not Detected    Influenza B Antigen BALA Not Detected Not Detected    Internal Control  Passed Passed    Lot Number [id number]     Expiration Date 11,102,024        Assessment & Plan   Diagnoses and all orders for this visit:    1. Bronchitis (Primary)  -     amoxicillin-clavulanate (AUGMENTIN) 875-125 MG per tablet; Take 1 tablet by mouth 2 (Two) Times a Day.  Dispense: 20 tablet; Refill: 0  -     Hydrocod Rk-Chlorphe Rk ER (TUSSIONEX PENNKINETIC) 10-8 MG/5ML ER suspension; Take 5 mL by mouth Every 12 (Twelve) Hours As Needed for Cough.  Dispense: 180 mL; Refill: 0    2. Cough, unspecified type  -     POCT SARS-CoV-2 + Flu Antigen BALA      Patient had doxycycline about a month ago, so we will prescribe Augmentin for bronchitis.  Tussionex given to take at night for his cough.  No steroids needed at this time, but patient will call if he feels like he needs a prescription for steroids.  Follow-up as needed

## 2024-01-12 DIAGNOSIS — J45.998 UNCONTROLLED PERSISTENT ASTHMA: ICD-10-CM

## 2024-01-12 DIAGNOSIS — J40 BRONCHITIS: ICD-10-CM

## 2024-01-12 RX ORDER — AMOXICILLIN AND CLAVULANATE POTASSIUM 875; 125 MG/1; MG/1
1 TABLET, FILM COATED ORAL 2 TIMES DAILY
Qty: 20 TABLET | Refills: 0 | Status: SHIPPED | OUTPATIENT
Start: 2024-01-12

## 2024-01-12 RX ORDER — HYDROCODONE POLISTIREX AND CHLORPHENIRAMINE POLISTIREX 10; 8 MG/5ML; MG/5ML
5 SUSPENSION, EXTENDED RELEASE ORAL EVERY 12 HOURS PRN
Qty: 180 ML | Refills: 0 | Status: SHIPPED | OUTPATIENT
Start: 2024-01-12

## 2024-01-12 RX ORDER — ALBUTEROL SULFATE 90 UG/1
2 AEROSOL, METERED RESPIRATORY (INHALATION) EVERY 4 HOURS PRN
Qty: 18 G | Refills: 0 | Status: SHIPPED | OUTPATIENT
Start: 2024-01-12

## 2024-01-12 NOTE — TELEPHONE ENCOUNTER
Caller: Georgi Khanna    Relationship to patient: Self    Best call back number: [phone]      Patient is needing: PATIENT CALLED BACK TO CHECK ON STATUS OF PRESCRIPTION TRANSFER REQUEST.  HE SAYS HE DROVE TO PHARMACY BUT THEY HAVE NOT RECEIVED ANY PRESCRIPTIONS YET.    PLEASE ADVISE.

## 2024-01-12 NOTE — TELEPHONE ENCOUNTER
You saw this patient yesterday and [facility] so [facility] would like [facility] instead (already selected for refill).

## 2024-01-12 NOTE — TELEPHONE ENCOUNTER
Caller: Georgi Khanna    Relationship: Self    Best call back number: [phone]     Requested Prescriptions:   Requested Prescriptions     Pending Prescriptions Disp Refills    albuterol sulfate  (90 Base) MCG/ACT inhaler 18 g 0     Sig: Inhale 2 puffs Every 4 (Four) Hours As Needed for Wheezing or Shortness of Air.    amoxicillin-clavulanate (AUGMENTIN) 875-125 MG per tablet 20 tablet 0     Sig: Take 1 tablet by mouth 2 (Two) Times a Day.    Hydrocod Rk-Chlorphe Rk ER (TUSSIONEX PENNKINETIC) 10-8 MG/5ML ER suspension 180 mL 0     Sig: Take 5 mL by mouth Every 12 (Twelve) Hours As Needed for Cough.        Pharmacy where request should be sent: [facility] [id number] [place], [place] - [address] [address] AT [facility] [phone] [facility] [phone] FX     Last office visit with prescribing clinician: 1/4/2024   Last telemedicine visit with prescribing clinician: 10/9/2023   Next office visit with prescribing clinician: 4/9/2024     Additional details provided by patient: PATIENT CALLED STATED THE [facility] WAS CLOSED YESTERDAY AND TODAY.  CAN DR VARGAS PLEASE CALL THESE PRESCRIPTIONS THAT WAS SENT TO [facility] YESTERDAY TO THE [facility].      Does the patient have less than a 3 day supply:  [] Yes  [] No    Would you like a call back once the refill request has been completed: [] Yes [] No    If the office needs to give you a call back, can they leave a voicemail: [] Yes [] No    Mary Alice Saldaña Rep   01/12/24 11:06 EST

## 2024-01-22 DIAGNOSIS — E11.65 TYPE 2 DIABETES MELLITUS WITH HYPERGLYCEMIA, WITHOUT LONG-TERM CURRENT USE OF INSULIN: ICD-10-CM

## 2024-02-18 DIAGNOSIS — E11.65 TYPE 2 DIABETES MELLITUS WITH HYPERGLYCEMIA, WITHOUT LONG-TERM CURRENT USE OF INSULIN: ICD-10-CM

## 2024-02-22 RX ORDER — HYDROCHLOROTHIAZIDE 25 MG/1
25 TABLET ORAL DAILY
Qty: 90 TABLET | Refills: 1 | Status: SHIPPED | OUTPATIENT
Start: 2024-02-22

## 2024-02-22 RX ORDER — LISINOPRIL 10 MG/1
10 TABLET ORAL DAILY
Qty: 90 TABLET | Refills: 1 | Status: SHIPPED | OUTPATIENT
Start: 2024-02-22

## 2024-02-22 NOTE — TELEPHONE ENCOUNTER
Caller: Goergi Khanna    Relationship: Self    Best call back number: [phone]     Requested Prescriptions:   Requested Prescriptions     Pending Prescriptions Disp Refills    Tirzepatide (MOUNJARO) 5 MG/0.5ML solution pen-injector pen 2 mL 0     Sig: Inject 0.5 mL under the skin into the appropriate area as directed 1 (One) Time Per Week.    hydroCHLOROthiazide 25 MG tablet 90 tablet 1     Sig: Take 1 tablet by mouth Daily.    lisinopril (PRINIVIL,ZESTRIL) 10 MG tablet 90 tablet 1     Sig: Take 1 tablet by mouth Daily.        Pharmacy where request should be sent: [facility] [phone] [facility] [id number] [facility] AT [facility] [phone] [facility] [phone]      Last office visit with prescribing clinician: 1/4/2024   Last telemedicine visit with prescribing clinician: 10/9/2023   Next office visit with prescribing clinician: 4/9/2024     Additional details provided by patient:  PATIENT STATED [facility] NOT USE [facility] AND NEEDS THIS [facility]CRIPTION SENT INSTEAD TO [facility].    PATIENT STATED HE IS OUT OF MOUNJARO.    PATIENT STATED HIS NEXT DOSE OF MOUNJARO IS ON 02-.    PATIENT STATED HE HAS ABOUT 10 DAYS REMAINING OF LISINOPRIL AND HYDROCHLOROTHIAZIDE.    Does the patient have less than a 3 day supply:  [x] Yes  [] No    Would you like a call back once the refill request has been completed: [] Yes [x] No    If the office needs to give you a call back, can they leave a voicemail: [x] Yes [] No    Mary Alice Gomez Rep   02/22/24 13:18 EST

## 2024-02-23 DIAGNOSIS — J45.998 UNCONTROLLED PERSISTENT ASTHMA: ICD-10-CM

## 2024-02-23 RX ORDER — ALBUTEROL SULFATE 90 UG/1
2 AEROSOL, METERED RESPIRATORY (INHALATION) EVERY 4 HOURS PRN
Qty: 8.5 G | Refills: 1 | Status: SHIPPED | OUTPATIENT
Start: 2024-02-23

## 2024-03-13 DIAGNOSIS — J40 BRONCHITIS: ICD-10-CM

## 2024-03-13 RX ORDER — AMOXICILLIN AND CLAVULANATE POTASSIUM 875; 125 MG/1; MG/1
1 TABLET, FILM COATED ORAL 2 TIMES DAILY
Qty: 20 TABLET | Refills: 0 | OUTPATIENT
Start: 2024-03-13

## 2024-03-21 DIAGNOSIS — J45.21 MILD INTERMITTENT ASTHMA WITH EXACERBATION: ICD-10-CM

## 2024-03-22 ENCOUNTER — TELEPHONE (OUTPATIENT)
Dept: INTERNAL MEDICINE | Facility: CLINIC | Age: 54
End: 2024-03-22
Payer: COMMERCIAL

## 2024-03-22 RX ORDER — FLUTICASONE PROPIONATE AND SALMETEROL 250; 50 UG/1; UG/1
1 POWDER RESPIRATORY (INHALATION)
Qty: 60 EACH | Refills: 5 | OUTPATIENT
Start: 2024-03-22

## 2024-03-22 NOTE — TELEPHONE ENCOUNTER
----- Message from CHERYLE Hensley sent at 3/22/2024 11:07 AM EDT -----  Regarding: FW: Mounjaro .75.   Contact: [phone]  Ok to refill 5 mg X 1 month  ----- Message -----  From: Ana Paula Bishop MA  Sent: 3/22/2024   7:09 AM EDT  To: CHERYLE Hensley  Subject: FW: Mounjaro .75.                                  ----- Message -----  From: Georgi Khanna  Sent: 3/21/2024   5:28 PM EDT  To: [facility] [place] [id number] [facility]  Subject: Mounjaro .75.                                    skyler Kapoor the [facility] .75 is on back order. They do have the .5 which is what I took this past month. I am out and if you want me to continue with .5 [facility] will need a new prescription.  Let me know. Thank you

## 2024-04-09 ENCOUNTER — OFFICE VISIT (OUTPATIENT)
Dept: INTERNAL MEDICINE | Facility: CLINIC | Age: 54
End: 2024-04-09
Payer: COMMERCIAL

## 2024-04-09 VITALS
BODY MASS INDEX: 37.37 KG/M2 | TEMPERATURE: 98 F | WEIGHT: 261 LBS | HEIGHT: 70 IN | HEART RATE: 101 BPM | SYSTOLIC BLOOD PRESSURE: 120 MMHG | DIASTOLIC BLOOD PRESSURE: 80 MMHG | OXYGEN SATURATION: 97 %

## 2024-04-09 DIAGNOSIS — J40 BRONCHITIS: ICD-10-CM

## 2024-04-09 DIAGNOSIS — M54.32 SCIATICA OF LEFT SIDE: Primary | ICD-10-CM

## 2024-04-09 PROCEDURE — 99214 OFFICE O/P EST MOD 30 MIN: CPT | Performed by: NURSE PRACTITIONER

## 2024-04-09 RX ORDER — CYCLOBENZAPRINE HCL 10 MG
10 TABLET ORAL NIGHTLY PRN
Qty: 20 TABLET | Refills: 0 | Status: SHIPPED | OUTPATIENT
Start: 2024-04-09

## 2024-04-09 RX ORDER — METHYLPREDNISOLONE 4 MG/1
TABLET ORAL
Qty: 21 TABLET | Refills: 0 | Status: SHIPPED | OUTPATIENT
Start: 2024-04-09

## 2024-04-09 NOTE — PROGRESS NOTES
Subjective   Georgi Khanna is a 53 y.o. male. Patient is here today for   Chief Complaint   Patient presents with   • Hip Pain   • Leg Pain   .    History of Present Illness   C/o left hip pain for a couple weeks associated with leg pain. Pain starts in left buttocks and goes to her foot. He has gone to chiropractor a few times with minimal relief. He had some left over hydrocodone with minimal relief. He is taking aleve and ibuprofen. No injury. He has been using an ice pack and stretching. Worse in the morning.    He has been off of his Mounjaro due to cost. The price went from $25 to $550. He will download the coupon. He hasn't had any nausea with the medication. He did recently have labs done and will follow up with his PCP.    The following portions of the patient's history were reviewed and updated as appropriate: allergies, current medications, past family history, past medical history, past social history, past surgical history and problem list.    Review of Systems    Objective   Vitals:    04/09/24 1343   BP: 120/80   Pulse: 101   Temp: 98 °F (36.7 °C)   SpO2: 97%     Body mass index is 37.45 kg/m².  Physical Exam  Vitals and nursing note reviewed.   Constitutional:       Appearance: Normal appearance. He is well-developed.   Cardiovascular:      Rate and Rhythm: Normal rate and regular rhythm.      Heart sounds: Normal heart sounds.   Pulmonary:      Effort: Pulmonary effort is normal.      Breath sounds: Normal breath sounds.   Skin:     General: Skin is warm and dry.   Neurological:      Mental Status: He is alert and oriented to person, place, and time.   Psychiatric:         Speech: Speech normal.         Behavior: Behavior normal.         Thought Content: Thought content normal.     Assessment & Plan   Diagnoses and all orders for this visit:    1. Sciatica of left side (Primary)  -     methylPREDNISolone (Medrol) 4 MG dose pack; follow package directions  Dispense: 21 tablet; Refill: 0  -      cyclobenzaprine (FLEXERIL) 10 MG tablet; Take 1 tablet by mouth At Night As Needed for Muscle Spasms.  Dispense: 20 tablet; Refill: 0      Sciatica - will prescribe the above medications. He was given a handout on stretches for piriformis syndrome.  If his symptoms do not improve, he can be referred to physical therapy    DM - patient downloaded the coupon for Mounjaro. Okay to start the 5 mg dosage since he has not had nausea in the past. He will schedule a f/u appt with [facility]

## 2024-04-11 RX ORDER — AMOXICILLIN AND CLAVULANATE POTASSIUM 875; 125 MG/1; MG/1
1 TABLET, FILM COATED ORAL 2 TIMES DAILY
Qty: 20 TABLET | Refills: 0 | OUTPATIENT
Start: 2024-04-11

## 2024-04-18 ENCOUNTER — OFFICE VISIT (OUTPATIENT)
Dept: INTERNAL MEDICINE | Facility: CLINIC | Age: 54
End: 2024-04-18
Payer: COMMERCIAL

## 2024-04-18 VITALS
HEART RATE: 118 BPM | SYSTOLIC BLOOD PRESSURE: 118 MMHG | BODY MASS INDEX: 36.51 KG/M2 | DIASTOLIC BLOOD PRESSURE: 68 MMHG | WEIGHT: 255 LBS | OXYGEN SATURATION: 98 % | HEIGHT: 70 IN

## 2024-04-18 DIAGNOSIS — E78.5 HYPERLIPIDEMIA, UNSPECIFIED HYPERLIPIDEMIA TYPE: ICD-10-CM

## 2024-04-18 DIAGNOSIS — E55.9 VITAMIN D DEFICIENCY: ICD-10-CM

## 2024-04-18 DIAGNOSIS — E11.9 TYPE 2 DIABETES MELLITUS WITHOUT COMPLICATION, WITHOUT LONG-TERM CURRENT USE OF INSULIN: Primary | ICD-10-CM

## 2024-04-18 DIAGNOSIS — M54.32 SCIATICA OF LEFT SIDE: ICD-10-CM

## 2024-04-18 RX ORDER — PREDNISONE 20 MG/1
TABLET ORAL
Qty: 18 TABLET | Refills: 0 | Status: SHIPPED | OUTPATIENT
Start: 2024-04-18

## 2024-04-18 RX ORDER — ERGOCALCIFEROL 1.25 MG/1
50000 CAPSULE ORAL WEEKLY
Qty: 8 CAPSULE | Refills: 0 | Status: SHIPPED | OUTPATIENT
Start: 2024-04-18 | End: 2024-06-07

## 2024-04-18 RX ORDER — CYCLOBENZAPRINE HCL 10 MG
10 TABLET ORAL NIGHTLY PRN
Qty: 30 TABLET | Refills: 0 | Status: SHIPPED | OUTPATIENT
Start: 2024-04-18

## 2024-04-18 NOTE — PROGRESS NOTES
Subjective   Georgi Khanna is a 53 y.o. male.      History of Present Illness   The patient is here today to F/U on lab work.   HPL-Pt is doing well with current medication regimen, denies adverse reactions, compliant with medication schedule.   Asthma- well controlled  HTN-Pt is doing well with current medication regimen, denies adverse reactions, compliant with medication schedule.     2-3 wks ago- L sciatica, new to patient, getting worse even after steroids and muscle relaxer. The past 2 days have been really bad. Neg for numbness, tingling, weakness (urinary/bowel incontinence)  He has seen a chiropractor but it is not helping.     The following portions of the patient's history were reviewed and updated as appropriate: allergies, current medications, past family history, past medical history, past social history, past surgical history and problem list.    Review of Systems   Constitutional:  Negative for chills and fever.   Respiratory: Negative.     Cardiovascular: Negative.    Musculoskeletal:  Positive for arthralgias. Negative for back pain.   Psychiatric/Behavioral:  Negative for dysphoric mood and suicidal ideas. The patient is not nervous/anxious.        Objective   Physical Exam  Constitutional:       Appearance: Normal appearance. He is well-developed.   Neck:      Thyroid: No thyromegaly.   Cardiovascular:      Rate and Rhythm: Normal rate and regular rhythm.      Heart sounds: Normal heart sounds.   Pulmonary:      Effort: Pulmonary effort is normal.      Breath sounds: Normal breath sounds.   Musculoskeletal:      Cervical back: Normal range of motion and neck supple.   Lymphadenopathy:      Cervical: No cervical adenopathy.   Skin:     General: Skin is warm and dry.   Neurological:      Mental Status: He is alert.   Psychiatric:         Behavior: Behavior normal.         Thought Content: Thought content normal.         Judgment: Judgment normal.         Vitals:    04/18/24 1531   BP: 118/68    Pulse: 118   SpO2: 98%     Body mass index is 36.59 kg/m².    Current Outpatient Medications:     albuterol sulfate  (90 Base) MCG/ACT inhaler, Inhale 2 puffs Every 4 (Four) Hours As Needed for Wheezing., Disp: 18 g, Rfl: 1    albuterol sulfate  (90 Base) MCG/ACT inhaler, INHALE 2 PUFFS BY MOUTH EVERY 4 HOURS AS NEEDED FOR WHEEZING OR SHORTNESS OF AIR, Disp: 8.5 g, Rfl: 1    atorvastatin (Lipitor) 20 MG tablet, Take 1 tablet by mouth Every Night., Disp: 90 tablet, Rfl: 1    cyclobenzaprine (FLEXERIL) 10 MG tablet, Take 1 tablet by mouth At Night As Needed for Muscle Spasms., Disp: 30 tablet, Rfl: 0    Fluticasone-Salmeterol (Wixela Inhub) 250-50 MCG/ACT DISKUS, Inhale 1 puff 2 (Two) Times a Day., Disp: 60 each, Rfl: 5    hydroCHLOROthiazide 25 MG tablet, Take 1 tablet by mouth Daily., Disp: 90 tablet, Rfl: 1    lisinopril (PRINIVIL,ZESTRIL) 10 MG tablet, Take 1 tablet by mouth Daily., Disp: 90 tablet, Rfl: 1    montelukast (SINGULAIR) 10 MG tablet, Take 1 tablet by mouth Every Night., Disp: 90 tablet, Rfl: 3    Tirzepatide (MOUNJARO) 5 MG/0.5ML solution pen-injector pen, Inject 0.5 mL under the skin into the appropriate area as directed 1 (One) Time Per Week., Disp: 2 mL, Rfl: 2    predniSONE (DELTASONE) 20 MG tablet, 3 tabs for 3 days, 2 tabs for 3 days, 1 tab for 3 days, Disp: 18 tablet, Rfl: 0    vitamin D (ERGOCALCIFEROL) 1.25 MG (74664 UT) capsule capsule, Take 1 capsule by mouth 1 (One) Time Per Week for 8 doses., Disp: 8 capsule, Rfl: 0   Results Encounter on 04/04/2024   Component Date Value Ref Range Status    Glucose 04/05/2024 112 (H)  65 - 99 mg/dL Final    BUN 04/05/2024 19  6 - 20 mg/dL Final    Creatinine 04/05/2024 0.87  0.76 - 1.27 mg/dL Final    EGFR Result 04/05/2024 103.8  >60.0 mL/min/1.73 Final    Comment: GFR Normal >60  Chronic Kidney Disease <60  Kidney Failure <15      BUN/Creatinine Ratio 04/05/2024 21.8  7.0 - 25.0 Final    Sodium 04/05/2024 141  136 - 145 mmol/L Final     Potassium 04/05/2024 4.2  3.5 - 5.2 mmol/L Final    Chloride 04/05/2024 101  98 - 107 mmol/L Final    Total CO2 04/05/2024 27.2  22.0 - 29.0 mmol/L Final    Calcium 04/05/2024 9.2  8.6 - 10.5 mg/dL Final    Total Protein 04/05/2024 6.9  6.0 - 8.5 g/dL Final    Albumin 04/05/2024 4.7  3.5 - 5.2 g/dL Final    Globulin 04/05/2024 2.2  gm/dL Final    A/G Ratio 04/05/2024 2.1  g/dL Final    Total Bilirubin 04/05/2024 0.3  0.0 - 1.2 mg/dL Final    Alkaline Phosphatase 04/05/2024 50  39 - 117 U/L Final    AST (SGOT) 04/05/2024 23  1 - 40 U/L Final    ALT (SGPT) 04/05/2024 45 (H)  1 - 41 U/L Final    Bilirubin, Direct 04/05/2024 <0.2  0.0 - 0.3 mg/dL Final    Hemoglobin A1C 04/05/2024 5.70 (H)  4.80 - 5.60 % Final    Comment: Hemoglobin A1C Ranges:  Increased Risk for Diabetes  5.7% to 6.4%  Diabetes                     >= 6.5%  Diabetic Goal                < 7.0%      25 Hydroxy, Vitamin D 04/05/2024 19.5 (L)  30.0 - 100.0 ng/ml Final    Comment: Reference Range for Total Vitamin D 25(OH)  Deficiency <20.0 ng/mL  Insufficiency 21-29 ng/mL  Sufficiency  ng/mL  Toxicity >100 ng/ml          Assessment & Plan   Diagnoses and all orders for this visit:    1. Type 2 diabetes mellitus without complication, without long-term current use of insulin (Primary)    2. Sciatica of left side  -     predniSONE (DELTASONE) 20 MG tablet; 3 tabs for 3 days, 2 tabs for 3 days, 1 tab for 3 days  Dispense: 18 tablet; Refill: 0  -     cyclobenzaprine (FLEXERIL) 10 MG tablet; Take 1 tablet by mouth At Night As Needed for Muscle Spasms.  Dispense: 30 tablet; Refill: 0  -     Ambulatory Referral to Physical Therapy Evaluate and treat  -     XR Spine Lumbar 4+ View; Future    3. Hyperlipidemia, unspecified hyperlipidemia type    4. Vitamin D deficiency  -     vitamin D (ERGOCALCIFEROL) 1.25 MG (60694 UT) capsule capsule; Take 1 capsule by mouth 1 (One) Time Per Week for 8 doses.  Dispense: 8 capsule; Refill: 0    Other orders  -      Tirzepatide (MOUNJARO) 5 MG/0.5ML solution pen-injector pen; Inject 0.5 mL under the skin into the appropriate area as directed 1 (One) Time Per Week.  Dispense: 2 mL; Refill: 2               1. DM2- well controlled, continue mounjaro, see optho  2. Sciatica- start prednisone taper, continue muscle relaxer's, check CXR, try PT, if symptoms worsen will order MRI  To ER if symptoms worsen.   3. HTN- well controlled  4. Vit D def- take D2 X8 weeks, then take D3 5000 IU daily

## 2024-04-18 NOTE — PATIENT INSTRUCTIONS
1. DM2- well controlled, continue mounjaro, see optho  2. Sciatica- start prednisone taper, continue muscle relaxer's, check CXR, try PT, if symptoms worsen will order MRI  3. HTN- well controlled  4. Vit D def- take D2 X8 weeks, then take D3 5000 IU daily

## 2024-04-25 ENCOUNTER — HOSPITAL ENCOUNTER (OUTPATIENT)
Dept: GENERAL RADIOLOGY | Facility: HOSPITAL | Age: 54
Discharge: HOME | End: 2024-04-25
Admitting: NURSE PRACTITIONER
Payer: COMMERCIAL

## 2024-04-25 DIAGNOSIS — M54.32 SCIATICA OF LEFT SIDE: ICD-10-CM

## 2024-04-25 PROCEDURE — 72110 X-RAY EXAM L-2 SPINE 4/>VWS: CPT

## 2024-04-29 ENCOUNTER — HOSPITAL ENCOUNTER (EMERGENCY)
Facility: HOSPITAL | Age: 54
Discharge: HOME | End: 2024-04-29
Attending: EMERGENCY MEDICINE | Admitting: EMERGENCY MEDICINE
Payer: COMMERCIAL

## 2024-04-29 VITALS
SYSTOLIC BLOOD PRESSURE: 131 MMHG | HEART RATE: 104 BPM | HEIGHT: 70 IN | RESPIRATION RATE: 16 BRPM | TEMPERATURE: 97 F | BODY MASS INDEX: 36.61 KG/M2 | OXYGEN SATURATION: 98 % | WEIGHT: 255.73 LBS | DIASTOLIC BLOOD PRESSURE: 84 MMHG

## 2024-04-29 DIAGNOSIS — M54.16 ACUTE LEFT LUMBAR RADICULOPATHY: Primary | ICD-10-CM

## 2024-04-29 PROCEDURE — 96372 THER/PROPH/DIAG INJ SC/IM: CPT

## 2024-04-29 PROCEDURE — 25010000002 KETOROLAC TROMETHAMINE PER 15 MG: Performed by: EMERGENCY MEDICINE

## 2024-04-29 PROCEDURE — 99283 EMERGENCY DEPT VISIT LOW MDM: CPT

## 2024-04-29 RX ORDER — METHOCARBAMOL 750 MG/1
1500 TABLET, FILM COATED ORAL ONCE
Status: COMPLETED | OUTPATIENT
Start: 2024-04-29 | End: 2024-04-29

## 2024-04-29 RX ORDER — HYDROCODONE BITARTRATE AND ACETAMINOPHEN 5; 325 MG/1; MG/1
1 TABLET ORAL EVERY 4 HOURS PRN
Qty: 12 TABLET | Refills: 0 | Status: SHIPPED | OUTPATIENT
Start: 2024-04-29

## 2024-04-29 RX ORDER — KETOROLAC TROMETHAMINE 30 MG/ML
30 INJECTION, SOLUTION INTRAMUSCULAR; INTRAVENOUS ONCE
Status: COMPLETED | OUTPATIENT
Start: 2024-04-29 | End: 2024-04-29

## 2024-04-29 RX ORDER — HYDROCODONE BITARTRATE AND ACETAMINOPHEN 5; 325 MG/1; MG/1
1 TABLET ORAL ONCE
Status: COMPLETED | OUTPATIENT
Start: 2024-04-29 | End: 2024-04-29

## 2024-04-29 RX ADMIN — HYDROCODONE BITARTRATE AND ACETAMINOPHEN 1 TABLET: 5; 325 TABLET ORAL at 10:59

## 2024-04-29 RX ADMIN — KETOROLAC TROMETHAMINE 30 MG: 30 INJECTION, SOLUTION INTRAMUSCULAR at 11:01

## 2024-04-29 RX ADMIN — METHOCARBAMOL TABLETS 1500 MG: 750 TABLET, COATED ORAL at 10:59

## 2024-04-29 NOTE — ED PROVIDER NOTES
SHARED VISIT: This visit was performed by BOTH a physician and an APC. The substantive portion of the medical decision making was performed by this attesting physician who made or approved the management plan and takes responsibility for patient management. All studies in the APC note (if performed) were independently interpreted by me.      The KAMILLA and I have discussed this patient's history, physical exam, and treatment plan.  I have reviewed the documentation and personally had a face to face interaction with the patient. I affirm the documentation and agree with the treatment and plan.  The attached note describes my personal findings.       I provided a substantive portion of the care of the patient.  I personally performed the physical exam in its entirety, and below are my findings.        History  53-year-old male presents with fairly severe lower back pain rating to the left hip.  He has been seen by his primary care provider and current medications including steroids and muscle relaxants not really helping a lot.    Physical Exam  Vital Signs reviewed  GENERAL: Alert male no obvious distress.  Triage vitals reviewed notable for elevated blood pressure 148/105.  HENT: nares patent  EYES: no scleral icterus  CV: regular rhythm, regular rate  RESPIRATORY: normal effort  ABDOMEN: soft, obese-nontender to palpation  MUSCULOSKELETAL: Spine-no significant bony tenderness to palpation  Upper extremities-atraumatic  Lower extremities-good range of motion at the right hip.  No noted bony deformities  NEURO: Strength sensation and coordination are grossly intact.  Speech and mentation are unremarkable  SKIN: warm, dry      Assessment and Data Review    ED Course as of 04/29/24 1617   Mon Apr 29, 2024   1259 Patient able to ambulate without assistance device with a steady gait.  Reports improvement of his discomfort.  Will plan to refer to neurosurgery and have him discuss MRI scheduling with his primary care  provider. [DC]      ED Course User Index  [DC] Honey Bobo, PA         I discussed treatment and evaluation this patient with GARTH Bobo.  Patient is already had plain films of the back which did show lumbar DJD.  Suspect were dealing with likely lumbar radiculopathy.  Will provide a short course of narcotic pain medication after review of Elias report.  Will have him follow-up with primary care provider and give neurosurgery referral.     Timo Blankenship MD  04/29/24 7976

## 2024-04-29 NOTE — ED TRIAGE NOTES
Patient to ED c/o left hip pain that radiates down his left leg pain. Patient also reports that he has had numbness in left leg x 5 days. Patient has had imaging done.

## 2024-04-29 NOTE — ED NOTES
Patient to ER via car from home able to walk to triage with cane that he normally doesn't use  Patient reports l low back and leg pain x a few weeks  Reports he had xray but is unable to get into DR

## 2024-04-29 NOTE — ED PROVIDER NOTES
EMERGENCY DEPARTMENT ENCOUNTER      PCP: Svaanna Dang APRN  Patient Care Team:  Savanna Dang APRN as PCP - General (Family Medicine)   Independent Historians: Patient    HPI:  Chief Complaint: Back pain, leg pain   A complete HPI/ROS/PMH/PSH/SH/FH are unobtainable due to: None    Chronic or social conditions impacting patient care (social determinants of health): None    Context: Georgi Khanna is a 53 y.o. male who presents to the ED c/o acute left-sided low back and hip pain radiating into the left lower extremity has been going on for several weeks without known injury.  Patient has been seen by primary care provider and has been on course of steroids, muscle relaxers without relief.  He had imaging performed of his lumbar spine which showed some degenerative changes.  He has been doing stretches at home for sciatica.  He states he is experiencing new numbness and burning type pain to his left foot and left outer leg.  He is using a cane for ambulation due to the severe pain.  He denies any urinary or bowel incontinence or difficulty urinating or saddle anesthesia.  No prior surgical history to his spine.  No fevers or chills.    Review of prior external notes and/or external test results outside of this encounter: Reviewed encounter with PCP on 4/18/24. Pt complained of new L sciatica 2-3 weeks worse after taking steroids and muscle relaxer. Seen by chiropractor but no improvement. Planned prednisone taper, continue muscle relaxers, xray, PT.  XR lumbar spine on 4/25/24 showed multilevel disc space narrowing involving lower thoracic spine and lumbar spine. No acute fracture.       PAST MEDICAL HISTORY  Active Ambulatory Problems     Diagnosis Date Noted    Asthma 05/02/2016    Morbid obesity due to excess calories 05/02/2016    Snoring 05/02/2016    Daytime somnolence 05/02/2016    Vitamin D deficiency 06/06/2016    Hyperlipidemia 06/06/2016    Type 2 diabetes mellitus, without long-term current use of  insulin 2016    Elevated ALT measurement 2016    Essential hypertension 2019    Incomplete bladder emptying 12/10/2019    Encounter for screening colonoscopy 2021     Resolved Ambulatory Problems     Diagnosis Date Noted    Elevated blood pressure reading 2016     Past Medical History:   Diagnosis Date    Allergic     Hypertension     Obesity     Pneumonia        The patient qualifies to receive the vaccine, but they have not yet received it.    PAST SURGICAL HISTORY  Past Surgical History:   Procedure Laterality Date    COLONOSCOPY N/A 2022    Procedure: COLONOSCOPY;  Surgeon: Shakir Baca MD;  Location: [facility] MAIN OR;  Service: Gastroenterology;  Laterality: N/A;  hemorrhoids, diverticulosis    UMBILICAL HERNIA REPAIR           FAMILY HISTORY  Family History   Problem Relation Age of Onset    Stroke Mother         40s    Failure to thrive Mother     Failure to thrive Father     Hypertension Father     Heart attack Father         in his 50s, [employer] BYPASS sx    Sleep apnea Father     Hyperlipidemia Father     Stroke Father     Seizures Father     Obesity Brother     Hypertension Brother     Heart attack Paternal Grandfather          in his 50s    No Known Problems Son          SOCIAL HISTORY  Social History     Socioeconomic History    Marital status:      Spouse name: LYNNE    Tobacco Use    Smoking status: Former     Current packs/day: 0.00     Average packs/day: 0.3 packs/day for 3.0 years (0.8 ttl pk-yrs)     Types: Cigarettes     Start date: 6/3/1986     Quit date: 1989     Years since quittin.9    Smokeless tobacco: Former     Types: Chew    Tobacco comments:     age [age] about 5 years   Vaping Use    Vaping status: Never Used   Substance and Sexual Activity    Alcohol use: Yes     Comment: Very rarely    Drug use: No    Sexual activity: Yes     Partners: Female     Birth control/protection: Surgical         ALLERGIES  Patient has no known  allergies.        REVIEW OF SYSTEMS  Review of Systems   Constitutional:  Negative for chills and fever.   Respiratory:  Negative for shortness of breath.    Cardiovascular:  Negative for chest pain and leg swelling.   Genitourinary:  Negative for difficulty urinating.   Musculoskeletal:  Positive for arthralgias (Left hip) and back pain.   Neurological:  Positive for numbness. Negative for weakness.        All systems reviewed and negative except for those discussed in HPI.       PHYSICAL EXAM    I have reviewed the triage vital signs and nursing notes.    ED Triage Vitals   Temp Heart Rate Resp BP SpO2   04/29/24 1011 04/29/24 1011 04/29/24 1011 04/29/24 1025 04/29/24 1011   97 °F (36.1 °C) (!) 132 16 (!) 148/105 98 %      Temp src Heart Rate Source Patient Position BP Location FiO2 (%)   -- -- -- -- --              Physical Exam  GENERAL: alert, no acute distress  SKIN: Warm, dry  HENT: Normocephalic, atraumatic  EYES: no scleral icterus  CV: regular rhythm, regular rate  RESPIRATORY: normal effort, lungs clear  ABDOMEN: soft, nontender, nondistended  MUSCULOSKELETAL: no deformity, no midline lumbar tenderness, discomfort to the left lumbar and left buttock region, increased pain with left straight leg raise, normal dorsiflexion and plantarflexion, distal pulses 2+, sensation intact to light touch  NEURO: alert, moves all extremities, follows commands          LAB RESULTS  Labs Reviewed - No data to display    Ordered the above labs and independently reviewed and interpreted the results.        RADIOLOGY  No orders to display       I ordered the above noted radiological studies. Independently reviewed and interpreted by me.  See dictation for official radiology interpretation.      PROCEDURES    Procedures      MEDICATIONS GIVEN IN ER    Medications   ketorolac (TORADOL) injection 30 mg (30 mg Intramuscular Given 4/29/24 1101)   HYDROcodone-acetaminophen (NORCO) 5-325 MG per tablet 1 tablet (1 tablet Oral Given  4/29/24 1059)   methocarbamol (ROBAXIN) tablet 1,500 mg (1,500 mg Oral Given 4/29/24 1059)         PROGRESS, DATA ANALYSIS, CONSULTS, AND MEDICAL DECISION MAKING    All labs have been independently reviewed and interpreted by me.  All radiology studies have been independently reviewed and interpreted by me and discussed with radiologist dictating the report.   EKG's independently reviewed and interpreted by me.  Discussion below represents my analysis of pertinent findings related to patient's condition, differential diagnosis, treatment plan and final disposition.    My differential diagnosis for back pain includes but is not limited to:  Musculoskeletal strain, contusion, retroperitoneal hematoma, disc protrusion, vertebral fracture, transverse process fracture, rib fracture, facet syndrome, sacroiliac joint strain, sciatica, renal injury, splenic injury, pancreatic injury, osteoarthritis, lumbar spondylosis, spinal stenosis, ankylosing spondylitis, sacroiliac joint inflammation, pancreatitis, perforated peptic ulcer, diverticulitis, epidural abscess, osteomyelitis, retroperitoneal abscess, pyelonephritis, pneumonia, subphrenic abscess, tuberculosis, neurofibroma, meningioma, multiple myeloma, lymphoma, metastatic cancer, primary cancer, AAA, aortic dissection, spinal ischemia, referred pain, ureterolithiasis      ED Course as of 04/30/24 2231 Mon Apr 29, 2024   1259 Patient able to ambulate without assistance device with a steady gait.  Reports improvement of his discomfort.  Will plan to refer to neurosurgery and have him discuss MRI scheduling with his primary care provider. [DC]      ED Course User Index  [DC] Honey Bobo PA             AS OF 22:31 EDT VITALS:    BP - 131/84  HR - 104  TEMP - 97 °F (36.1 °C)  O2 SATS - 98%        DIAGNOSIS  Final diagnoses:   Acute left lumbar radiculopathy         DISPOSITION  ED Disposition       ED Disposition   Discharge    Condition   Stable    Comment   --                   Note Disclaimer: At [facility], we believe that sharing information builds trust and better relationships. You are receiving this note because you recently visited [facility]. It is possible you will see health information before a provider has talked with you about it. This kind of information can be easy to misunderstand. To help you fully understand what it means for your health, we urge you to discuss this note with your provider.         Honey Bobo PA  04/30/24 9754

## 2024-04-30 DIAGNOSIS — M54.16 LUMBAR RADICULOPATHY: ICD-10-CM

## 2024-04-30 DIAGNOSIS — M54.50 ACUTE LOW BACK PAIN, UNSPECIFIED BACK PAIN LATERALITY, UNSPECIFIED WHETHER SCIATICA PRESENT: Primary | ICD-10-CM

## 2024-05-07 ENCOUNTER — PATIENT ROUNDING (BHMG ONLY) (OUTPATIENT)
Dept: NEUROSURGERY | Facility: CLINIC | Age: 54
End: 2024-05-07

## 2024-05-07 ENCOUNTER — OFFICE VISIT (OUTPATIENT)
Dept: NEUROSURGERY | Facility: CLINIC | Age: 54
End: 2024-05-07
Payer: COMMERCIAL

## 2024-05-07 VITALS
SYSTOLIC BLOOD PRESSURE: 122 MMHG | DIASTOLIC BLOOD PRESSURE: 90 MMHG | HEART RATE: 109 BPM | BODY MASS INDEX: 36.79 KG/M2 | WEIGHT: 257 LBS | OXYGEN SATURATION: 99 % | HEIGHT: 70 IN

## 2024-05-07 DIAGNOSIS — M54.16 LUMBAR RADICULOPATHY: Primary | ICD-10-CM

## 2024-05-07 DIAGNOSIS — M54.50 ACUTE LOW BACK PAIN, UNSPECIFIED BACK PAIN LATERALITY, UNSPECIFIED WHETHER SCIATICA PRESENT: ICD-10-CM

## 2024-05-07 DIAGNOSIS — R29.898 WEAKNESS OF LEFT FOOT: ICD-10-CM

## 2024-05-07 PROCEDURE — 99214 OFFICE O/P EST MOD 30 MIN: CPT | Performed by: NURSE PRACTITIONER

## 2024-05-07 RX ORDER — GABAPENTIN 300 MG/1
600 CAPSULE ORAL EVERY EVENING
Qty: 60 CAPSULE | Refills: 2 | Status: SHIPPED | OUTPATIENT
Start: 2024-05-07

## 2024-05-07 RX ORDER — MELOXICAM 15 MG/1
15 TABLET ORAL DAILY
Qty: 30 TABLET | Refills: 3 | Status: SHIPPED | OUTPATIENT
Start: 2024-05-07

## 2024-05-08 ENCOUNTER — TELEPHONE (OUTPATIENT)
Dept: NEUROSURGERY | Facility: CLINIC | Age: 54
End: 2024-05-08

## 2024-05-08 DIAGNOSIS — M54.16 LUMBAR RADICULOPATHY: ICD-10-CM

## 2024-05-08 DIAGNOSIS — M54.50 ACUTE LOW BACK PAIN, UNSPECIFIED BACK PAIN LATERALITY, UNSPECIFIED WHETHER SCIATICA PRESENT: ICD-10-CM

## 2024-05-08 DIAGNOSIS — M54.32 SCIATICA OF LEFT SIDE: Primary | ICD-10-CM

## 2024-05-08 NOTE — TELEPHONE ENCOUNTER
PATIENT BRO CONNIE CALLED TO INFORM ADRIANNA BATES THAT HIS PCP GOT HIS MRI APPROVED. HE WILL BE GETTING THAT DONE NOW - THEY WILL BE CALLING HIM TO SCHEDULED TODAY, HE STATES. HE WANTED US TO KNOW SO WE DIDN'T WASTE TIME TRYING TO PURSUE GETTING IT APPROVED ON OUR END.

## 2024-05-16 ENCOUNTER — TELEPHONE (OUTPATIENT)
Dept: NEUROSURGERY | Facility: CLINIC | Age: 54
End: 2024-05-16
Payer: COMMERCIAL

## 2024-05-21 ENCOUNTER — OFFICE VISIT (OUTPATIENT)
Dept: NEUROSURGERY | Facility: CLINIC | Age: 54
End: 2024-05-21
Payer: COMMERCIAL

## 2024-05-21 VITALS
BODY MASS INDEX: 36.99 KG/M2 | OXYGEN SATURATION: 97 % | WEIGHT: 257.8 LBS | DIASTOLIC BLOOD PRESSURE: 80 MMHG | SYSTOLIC BLOOD PRESSURE: 118 MMHG | HEART RATE: 105 BPM

## 2024-05-21 DIAGNOSIS — M54.16 LUMBAR RADICULOPATHY: ICD-10-CM

## 2024-05-21 DIAGNOSIS — R29.898 WEAKNESS OF LEFT FOOT: ICD-10-CM

## 2024-05-21 RX ORDER — GABAPENTIN 300 MG/1
300 CAPSULE ORAL 4 TIMES DAILY
Qty: 120 CAPSULE | Refills: 3 | Status: SHIPPED | OUTPATIENT
Start: 2024-05-21

## 2024-05-21 NOTE — PROGRESS NOTES
-continue pantoprazole      Subjective   Patient ID: Georgi Khanna is a 53 y.o. male following up in our office today after getting his lumbar MRI for left lumbar radiculopathy started a little over a month ago.     History of Present Illness    He was seen in [facility] ED 4/29 for acute left lumbar radiculopathy where he was treated with prednisone and flexeril without relief.  He then saw Ewelina Mejia in our office 5/7/2024 and reported constant severe pain in his left buttock radiating all the way down his left leg to the top of his foot that is worse walking. He attested to some tingling in the left leg but no numbness.  He also reported feeling weaker and has been needing to use a cane for ambulation due to concerns for falling. At that time he had not started physical therapy.  He was started on meloxicam and a nighttime dose of gabapentin.    Today he reports much improvement in his left hip and leg pain.  He has been doing a lot of stretching at the chiropractor for his left hip and he says it no longer feels as tight.  He still has some left leg pain particularly from his knee down to his calf to the top of his foot described as a burning sensation but this is greatly improved with the addition of gabapentin.  He still complains of some achiness and dullness in his left leg that progresses as the day goes on. He is a  and is on his feet most of the day.  He no longer needs to use his cane and says he feels much stronger and steady on his feet.  Denies bowel or bladder incontinence or saddle paresthesias.    He presents unaccompanied     The following portions of the patient's history were reviewed and updated as appropriate: allergies, current medications, past family history, past medical history, past social history, past surgical history and problem list.    Review of Systems   Gastrointestinal:  Negative for constipation.   Genitourinary:  Negative for difficulty urinating and enuresis.   Musculoskeletal:  Positive for back pain and  gait problem.   Neurological:  Positive for weakness and numbness.   Psychiatric/Behavioral:  Positive for sleep disturbance.        /80   Pulse 105   Wt 117 kg (257 lb 12.8 oz)   SpO2 97%   BMI 36.99 kg/m²      Objective     Vitals:    05/21/24 0858   BP: 118/80   Pulse: 105   SpO2: 97%   Weight: 117 kg (257 lb 12.8 oz)     Body mass index is 36.99 kg/m².    Tobacco Use: Medium Risk (5/21/2024)    Patient History     Smoking Tobacco Use: Former     Smokeless Tobacco Use: Former     Passive Exposure: Not on file          Physical Exam  Vitals reviewed.   Constitutional:       Appearance: Normal appearance.   Pulmonary:      Effort: Pulmonary effort is normal.   Musculoskeletal:      Lumbar back: Tenderness present. Positive left straight leg raise test.      Comments:   Mild left sciatic notch tenderness.    Neurological:      Mental Status: He is alert and oriented to person, place, and time.      Gait: Gait is intact.      Deep Tendon Reflexes:      Reflex Scores:       Patellar reflexes are 1+ on the right side and 2+ on the left side.      Neurologic Exam     Mental Status   Oriented to person, place, and time.   Level of consciousness: alert    Motor Exam   Left leg tone: decreased (slight decrease in L calf defenition)    Strength   Strength 5/5 except as noted. 4+/5 L dorsal flexion       Sensory Exam   Light touch normal.     Gait, Coordination, and Reflexes     Gait  Gait: normal    Reflexes   Right patellar: 1+  Left patellar: 2+  Right ankle clonus: absent  Left ankle clonus: absent  Able to walk on heels and toes without issue             Assessment & Plan   Independent Review of Radiographic Studies:      I personally reviewed the images from the following studies.    MRI Lumbar dated May 14, 2024 significant for small disc bulge with some disc extrusion at L4-5 and L5-S1 resulting in L5 nerve root involvement.  Facet arthropathy throughout most severe at L4-5 and L5-S1.  No significant spinal  canal stenosis.    Medical Decision Making:      He is following up in the office today for left hip and leg pain that been present for little over a month.  MRI shows multilevel degenerative changes including facet arthropathy most significant at L4-5 and L5-S1 without any severe stenosis. He has been working with chiropractor and no longer complains of left hip pain and says that the pain in his left leg is greatly improved with addition of gabapentin and meloxicam.  Only complains of some dull achiness/throbbing  in his calf and some burning on the top of his foot.  He says that he feels much stronger and is no longer needing to use a cane.  He has some very mild weakness in the dorsiflexion of his left foot, 4+/5.  He has a steady upright gait and is able to walk on his toes and heels.  Will keep him on his Moxicaine as this is helping his arthritis pain.  He is tolerating his nighttime gabapentin well and will increase this to include 300 mg in the a.m. and afternoon in hopes that this continues to improve his leg symptoms.  Encouraged him to continue stretching and low impact exercises either with a chiropractor or physical therapist.  Will order outpatient PT so that he has the option.  Will follow-up with the patient in about 3 to 4 months to see how he is doing.  Patient instructed to contact us sooner if he is having any additional symptoms or his pain is worsening.    Diagnoses and all orders for this visit:    1. Lumbar radiculopathy  -     gabapentin (NEURONTIN) 300 MG capsule; Take 1 capsule by mouth 4 (Four) Times a Day.  Dispense: 120 capsule; Refill: 3  -     Ambulatory Referral to Physical Therapy    2. Weakness of left foot  -     gabapentin (NEURONTIN) 300 MG capsule; Take 1 capsule by mouth 4 (Four) Times a Day.  Dispense: 120 capsule; Refill: 3  -     Ambulatory Referral to Physical Therapy      Return in about 3 months (around 8/21/2024) for Next scheduled follow up.

## 2024-05-21 NOTE — PROGRESS NOTES
Patient ID:   Georgi Khanna is a 53 y.o. male following up in our office today after getting his lumbar MRI for left lumbar radiculopathy started a little over a month ago.    No chief complaint on file.      History of Present Illness    He was seen in [facility] ED 4/29 for acute left lumbar radiculopathy where he was treated with prednisone and flexeril without relief.  He then saw Ewelina Mejia in our office 5/7/2024 and reported constant severe pain in his left buttock radiating all the way down his left leg to the top of his foot.  He is able to get some relief sitting or lying down and pain is worse walking.  He attested to some tingling in the left leg but no numbness.  He also reported feeling weaker and has been needing to use a cane for ambulation due to concerns for falling. At that time he had not started physical therapy.  He was started on meloxicam and 60 mg of gabapentin nightly at previous visit.    The following portions of the patient's history were reviewed and updated as appropriate: allergies, current medications, past family history, past medical history, past social history, past surgical history and problem list.       Objective:    There were no vitals filed for this visit.  There is no height or weight on file to calculate BMI.    Physical Exam  Neurologic Exam     Results:   MRI Lumbar: Significant for disc bulge at L4-5 and L5-S1 resulting in L5 nerve root involvement and     Assessment/Plan:       There are no diagnoses linked to this encounter.     No follow-ups on file.

## 2024-05-25 DIAGNOSIS — E78.5 HYPERLIPIDEMIA, UNSPECIFIED HYPERLIPIDEMIA TYPE: ICD-10-CM

## 2024-05-25 DIAGNOSIS — J45.21 MILD INTERMITTENT ASTHMA WITH EXACERBATION: ICD-10-CM

## 2024-05-28 RX ORDER — ATORVASTATIN CALCIUM 20 MG/1
20 TABLET, FILM COATED ORAL
Qty: 90 TABLET | Refills: 1 | Status: SHIPPED | OUTPATIENT
Start: 2024-05-28

## 2024-05-28 RX ORDER — HYDROCHLOROTHIAZIDE 25 MG/1
25 TABLET ORAL DAILY
Qty: 90 TABLET | Refills: 1 | Status: SHIPPED | OUTPATIENT
Start: 2024-05-28

## 2024-05-28 RX ORDER — FLUTICASONE PROPIONATE AND SALMETEROL 250; 50 UG/1; UG/1
1 POWDER RESPIRATORY (INHALATION) 2 TIMES DAILY
Qty: 180 EACH | Refills: 1 | Status: SHIPPED | OUTPATIENT
Start: 2024-05-28

## 2024-05-28 RX ORDER — LISINOPRIL 10 MG/1
10 TABLET ORAL DAILY
Qty: 90 TABLET | Refills: 1 | Status: SHIPPED | OUTPATIENT
Start: 2024-05-28

## 2024-06-06 RX ORDER — MELOXICAM 15 MG/1
15 TABLET ORAL DAILY
Qty: 30 TABLET | Refills: 3 | Status: SHIPPED | OUTPATIENT
Start: 2024-06-06

## 2024-06-15 DIAGNOSIS — E55.9 VITAMIN D DEFICIENCY: ICD-10-CM

## 2024-06-18 RX ORDER — ERGOCALCIFEROL 1.25 MG/1
CAPSULE ORAL
Qty: 4 CAPSULE | OUTPATIENT
Start: 2024-06-18

## 2024-07-15 DIAGNOSIS — M54.16 LUMBAR RADICULOPATHY: ICD-10-CM

## 2024-07-15 DIAGNOSIS — R29.898 WEAKNESS OF LEFT FOOT: ICD-10-CM

## 2024-07-15 RX ORDER — GABAPENTIN 300 MG/1
300 CAPSULE ORAL 4 TIMES DAILY
Qty: 120 CAPSULE | Refills: 3 | Status: SHIPPED | OUTPATIENT
Start: 2024-07-15

## 2024-07-15 RX ORDER — MELOXICAM 15 MG/1
15 TABLET ORAL DAILY
Qty: 30 TABLET | Refills: 5 | Status: SHIPPED | OUTPATIENT
Start: 2024-07-15

## 2024-08-19 RX ORDER — TIRZEPATIDE 5 MG/.5ML
INJECTION, SOLUTION SUBCUTANEOUS
Qty: 2 ML | Refills: 2 | Status: SHIPPED | OUTPATIENT
Start: 2024-08-19

## 2024-10-23 DIAGNOSIS — E55.9 VITAMIN D DEFICIENCY: Primary | ICD-10-CM

## 2024-10-23 DIAGNOSIS — E11.65 TYPE 2 DIABETES MELLITUS WITH HYPERGLYCEMIA, WITHOUT LONG-TERM CURRENT USE OF INSULIN: ICD-10-CM

## 2024-10-23 DIAGNOSIS — E78.5 HYPERLIPIDEMIA, UNSPECIFIED HYPERLIPIDEMIA TYPE: ICD-10-CM

## 2025-03-03 RX ORDER — HYDROCHLOROTHIAZIDE 25 MG/1
25 TABLET ORAL DAILY
Qty: 90 TABLET | Refills: 1 | Status: SHIPPED | OUTPATIENT
Start: 2025-03-03

## 2025-03-21 ENCOUNTER — HOSPITAL ENCOUNTER (OUTPATIENT)
Facility: HOSPITAL | Age: 55
Discharge: HOME | End: 2025-03-21
Attending: EMERGENCY MEDICINE
Payer: MEDICAID

## 2025-03-21 VITALS
WEIGHT: 240 LBS | RESPIRATION RATE: 18 BRPM | DIASTOLIC BLOOD PRESSURE: 101 MMHG | HEIGHT: 70 IN | OXYGEN SATURATION: 96 % | HEART RATE: 106 BPM | SYSTOLIC BLOOD PRESSURE: 173 MMHG | BODY MASS INDEX: 34.36 KG/M2 | TEMPERATURE: 98.7 F

## 2025-03-21 DIAGNOSIS — J01.00 ACUTE MAXILLARY SINUSITIS, RECURRENCE NOT SPECIFIED: Primary | ICD-10-CM

## 2025-03-21 DIAGNOSIS — R05.1 ACUTE COUGH: ICD-10-CM

## 2025-03-21 PROCEDURE — 99213 OFFICE O/P EST LOW 20 MIN: CPT | Performed by: PHYSICIAN ASSISTANT

## 2025-03-21 PROCEDURE — G0463 HOSPITAL OUTPT CLINIC VISIT: HCPCS | Performed by: PHYSICIAN ASSISTANT

## 2025-03-21 RX ORDER — FLUTICASONE PROPIONATE 50 MCG
2 SPRAY, SUSPENSION (ML) NASAL DAILY
Qty: 18.2 G | Refills: 0 | Status: SHIPPED | OUTPATIENT
Start: 2025-03-21

## 2025-03-21 RX ORDER — ALBUTEROL SULFATE 90 UG/1
2 INHALANT RESPIRATORY (INHALATION) EVERY 4 HOURS PRN
Qty: 18 G | Refills: 0 | Status: SHIPPED | OUTPATIENT
Start: 2025-03-21

## 2025-03-21 RX ORDER — DEXTROMETHORPHAN HYDROBROMIDE AND PROMETHAZINE HYDROCHLORIDE 15; 6.25 MG/5ML; MG/5ML
5 SYRUP ORAL 4 TIMES DAILY PRN
Qty: 180 ML | Refills: 0 | Status: SHIPPED | OUTPATIENT
Start: 2025-03-21

## 2025-03-21 RX ORDER — PREDNISONE 20 MG/1
20 TABLET ORAL 2 TIMES DAILY
Qty: 10 TABLET | Refills: 0 | Status: SHIPPED | OUTPATIENT
Start: 2025-03-21 | End: 2025-03-26

## 2025-03-21 RX ORDER — AZITHROMYCIN 250 MG/1
TABLET, FILM COATED ORAL
Qty: 6 TABLET | Refills: 0 | Status: SHIPPED | OUTPATIENT
Start: 2025-03-21

## 2025-03-21 NOTE — FSED PROVIDER NOTE
EMERGENCY DEPARTMENT ENCOUNTER    Room Number:  12/12  Date seen:  3/21/2025  Time seen: 17:59 EDT  PCP: Savanna Dang APRN  Historian: Patient    Discussed/obtained information from independent historians: N/A    HPI:  Chief complaint: Sinus pain and congestion, cough  A complete HPI/ROS/PMH/PSH/SH/FH are unobtainable due to: Nothing  Context:Georgi Khanna is a 54 y.o. male significant past medical history of asthma, hypertension, hyperlipidemia, diabetes who presents to the ED with c/o sinus pain and congestion along with cough.  Sinus pain and congestion has been ongoing for roughly 10 days.  The cough is said to be mild and has been ongoing for the past 3 to 4 days.  It seems to be worse at night in the morning is said to be nonproductive.  No fever and chills.  No specific sick contacts.  No chest pain, palpitations, shortness of breath.  No GI symptoms or  symptoms.  No known specific sick contacts at this time.        Chronic or social conditions impacting care:    ALLERGIES  Patient has no known allergies.    PAST MEDICAL HISTORY  Active Ambulatory Problems     Diagnosis Date Noted    Asthma 05/02/2016    Morbid obesity due to excess calories 05/02/2016    Snoring 05/02/2016    Daytime somnolence 05/02/2016    Vitamin D deficiency 06/06/2016    Hyperlipidemia 06/06/2016    Type 2 diabetes mellitus, without long-term current use of insulin 06/06/2016    Elevated ALT measurement 12/12/2016    Essential hypertension 07/29/2019    Incomplete bladder emptying 12/10/2019    Encounter for screening colonoscopy 12/28/2021    Lumbar radiculopathy 05/07/2024    Weakness of left foot 05/07/2024     Resolved Ambulatory Problems     Diagnosis Date Noted    Elevated blood pressure reading 05/02/2016     Past Medical History:   Diagnosis Date    Allergic     Hypertension     Obesity     Pneumonia 1993       PAST SURGICAL HISTORY  Past Surgical History:   Procedure Laterality Date    COLONOSCOPY N/A 04/25/2022     Procedure: COLONOSCOPY;  Surgeon: Shakir Baca MD;  [place] [facility] [place];  Service: Gastroenterology;  Laterality: N/A;  hemorrhoids, diverticulosis    UMBILICAL HERNIA REPAIR         FAMILY HISTORY  Family History   Problem Relation Age of Onset    Stroke Mother         40s    Failure to thrive Mother     Failure to thrive Father     Hypertension Father     Heart attack Father         in his 50s, Quad BYPASS sx    Sleep apnea Father     Hyperlipidemia Father     Stroke Father     Seizures Father     Obesity Brother     Hypertension Brother     Heart attack Paternal Grandfather          in his 50s    No Known Problems Son        SOCIAL HISTORY  Social History     Socioeconomic History    Marital status:      Spouse name: LYNNE    Tobacco Use    Smoking status: Former     Current packs/day: 0.00     Average packs/day: 0.3 packs/day for 3.0 years (0.8 ttl pk-yrs)     Types: Cigarettes     Start date: 6/3/1986     Quit date: 1989     Years since quittin.8    Smokeless tobacco: Former     Types: Chew    Tobacco comments:     age [age] about 5 years   Vaping Use    Vaping status: Never Used   Substance and Sexual Activity    Alcohol use: Yes     Comment: Very rarely    Drug use: No    Sexual activity: Yes     Partners: Female     Birth control/protection: Surgical       REVIEW OF SYSTEMS  Review of Systems    All systems reviewed and negative except for those discussed in HPI.     PHYSICAL EXAM    I have reviewed the triage vital signs and nursing notes.  Vitals:    25 1718   BP: (!) 173/101   Pulse:    Resp: 18   Temp: 98.7 °F (37.1 °C)   SpO2:      Physical Exam    GENERAL: WDWN male, not distressed  HENT: nares patent.  TTP maxillary sinuses left greater than right.  TMs unremarkable.  Oropharynx unremarkable.  EYES: no scleral icterus  NECK: no ROM limitations  CV: regular rhythm, regular rate, normal S1-S2  RESPIRATORY: normal effort lungs CTAB  ABDOMEN: soft, nontender  :  deferred  MUSCULOSKELETAL: no deformity  NEURO: alert, moves all extremities, follows commands  SKIN: warm, dry    LAB RESULTS  No results found for this or any previous visit (from the past 24 hours).    Ordered the above labs and independently interpreted results.  My findings will be discussed in the ED course or medical decision making section below    RADIOLOGY RESULTS  No Radiology Exams Resulted Within Past 24 Hours     Ordered the above noted radiological studies.  Independently interpreted by me.  My findings will be discussed in the medical decision section below.     PROGRESS, DATA ANALYSIS, CONSULTS AND MEDICAL DECISION MAKING    Please note that this section constitutes my independent interpretation of clinical data including lab results, radiology, EKG's.  This constitutes my independent professional opinion regarding differential diagnosis and management of this patient.  It may include any factors such as history from outside sources, review of external records, social determinants of health, management of medications, response to those treatments, and discussions with other providers.       Orders placed during this visit:  No orders of the defined types were placed in this encounter.           Medical Decision Making    DDx: COVID, flu, RSV, acute sinusitis other viral illness.  Symptoms ongoing for the past 10 to 12 days.  Patient has been taking OTC medications without any improvement.  Will go ahead and treat as a sinusitis and ensure he follows up closely with primary care.      DIAGNOSIS  Final diagnoses:   Acute maxillary sinusitis, recurrence not specified   Acute cough          Medication List        New Prescriptions      azithromycin 250 MG tablet  Commonly known as: Zithromax Z-Bala  Take 2 tablets the first day, then 1 tablet daily for 4 days.     fluticasone 50 MCG/ACT nasal spray  Commonly known as: FLONASE  Administer 2 sprays into the nostril(s) as directed by provider Daily.      predniSONE 20 MG tablet  Commonly known as: DELTASONE  Take 1 tablet by mouth 2 (Two) Times a Day for 5 days.     promethazine-dextromethorphan 6.25-15 MG/5ML syrup  Commonly known as: PROMETHAZINE-DM  Take 5 mL by mouth 4 (Four) Times a Day As Needed for Cough.               Where to Get Your Medications        These medications were sent to [facility] [id number] - [place], [place] - [address] [address] AT [facility] [address] - [phone]  - [phone]   [address], [facility] [address]      Phone: [phone]   azithromycin 250 MG tablet  fluticasone 50 MCG/ACT nasal spray  predniSONE 20 MG tablet  promethazine-dextromethorphan 6.25-15 MG/5ML syrup         FOLLOW-UP  No follow-up provider specified.      Latest Documented Vital Signs:  As of 18:05 EDT  BP- (!) 173/101 HR- 106 Temp- 98.7 °F (37.1 °C) (Oral) O2 sat- 96%    Appropriate PPE utilized throughout this patient encounter to include mask, if indicated, per current protocol. Hand hygiene was performed before donning PPE and after removal when leaving the room.    Please note that portions of this were completed with a voice recognition program.     Note Disclaimer: At [facility], we believe that sharing information builds trust and better relationships. You are receiving this note because you are receiving care at [facility] or recently visited. It is possible you will see health information before a provider has talked with you about it. This kind of information can be easy to misunderstand. To help you fully understand what it means for your health, we urge you to discuss this note with your provider.

## 2025-05-16 ENCOUNTER — APPOINTMENT (OUTPATIENT)
Dept: GENERAL RADIOLOGY | Facility: HOSPITAL | Age: 55
End: 2025-05-16

## 2025-05-16 ENCOUNTER — HOSPITAL ENCOUNTER (OUTPATIENT)
Facility: HOSPITAL | Age: 55
Discharge: HOME | End: 2025-05-16
Attending: EMERGENCY MEDICINE

## 2025-05-16 VITALS
DIASTOLIC BLOOD PRESSURE: 100 MMHG | SYSTOLIC BLOOD PRESSURE: 146 MMHG | WEIGHT: 240 LBS | HEIGHT: 70 IN | BODY MASS INDEX: 34.36 KG/M2 | RESPIRATION RATE: 16 BRPM | HEART RATE: 107 BPM | OXYGEN SATURATION: 96 % | TEMPERATURE: 98 F

## 2025-05-16 DIAGNOSIS — J45.21 EXACERBATION OF INTERMITTENT ASTHMA, UNSPECIFIED ASTHMA SEVERITY: Primary | ICD-10-CM

## 2025-05-16 DIAGNOSIS — J20.9 ACUTE BRONCHITIS, UNSPECIFIED ORGANISM: ICD-10-CM

## 2025-05-16 LAB
FLUAV SUBTYP SPEC NAA+PROBE: NOT DETECTED
FLUBV RNA ISLT QL NAA+PROBE: NOT DETECTED
SARS-COV-2 RNA RESP QL NAA+PROBE: NOT DETECTED

## 2025-05-16 PROCEDURE — 71046 X-RAY EXAM CHEST 2 VIEWS: CPT

## 2025-05-16 PROCEDURE — G0463 HOSPITAL OUTPT CLINIC VISIT: HCPCS | Performed by: EMERGENCY MEDICINE

## 2025-05-16 PROCEDURE — 99213 OFFICE O/P EST LOW 20 MIN: CPT | Performed by: EMERGENCY MEDICINE

## 2025-05-16 PROCEDURE — 87636 SARSCOV2 & INF A&B AMP PRB: CPT | Performed by: EMERGENCY MEDICINE

## 2025-05-16 PROCEDURE — 63710000001 PREDNISONE PER 5 MG: Performed by: EMERGENCY MEDICINE

## 2025-05-16 RX ORDER — AZITHROMYCIN 250 MG/1
TABLET, FILM COATED ORAL
Qty: 6 TABLET | Refills: 0 | Status: SHIPPED | OUTPATIENT
Start: 2025-05-16

## 2025-05-16 RX ORDER — IPRATROPIUM BROMIDE AND ALBUTEROL SULFATE 2.5; .5 MG/3ML; MG/3ML
3 SOLUTION RESPIRATORY (INHALATION) ONCE
Status: COMPLETED | OUTPATIENT
Start: 2025-05-16 | End: 2025-05-16

## 2025-05-16 RX ORDER — PREDNISONE 20 MG/1
40 TABLET ORAL DAILY
Qty: 14 TABLET | Refills: 0 | Status: SHIPPED | OUTPATIENT
Start: 2025-05-16 | End: 2025-05-23

## 2025-05-16 RX ORDER — ALBUTEROL SULFATE 90 UG/1
2 INHALANT RESPIRATORY (INHALATION) EVERY 4 HOURS PRN
Qty: 6.7 G | Refills: 0 | Status: SHIPPED | OUTPATIENT
Start: 2025-05-16

## 2025-05-16 RX ORDER — BROMPHENIRAMINE MALEATE, PSEUDOEPHEDRINE HYDROCHLORIDE, AND DEXTROMETHORPHAN HYDROBROMIDE 2; 30; 10 MG/5ML; MG/5ML; MG/5ML
5 SYRUP ORAL 4 TIMES DAILY PRN
Qty: 118 ML | Refills: 0 | Status: SHIPPED | OUTPATIENT
Start: 2025-05-16

## 2025-05-16 RX ADMIN — PREDNISONE 60 MG: 50 TABLET ORAL at 12:57

## 2025-05-16 RX ADMIN — IPRATROPIUM BROMIDE AND ALBUTEROL SULFATE 3 ML: .5; 3 SOLUTION RESPIRATORY (INHALATION) at 12:57

## 2025-05-16 NOTE — FSED PROVIDER NOTE
Subjective   History of Present Illness  55yo male pmh significant htn/hyperlipidemia/dm2/asthma presents ED c/o 6d hx productive cough/congestion/rhinorrhea/soa/wheezing.  Denies fever/chills/nausea/vomiting.    History provided by:  Patient  Cough  Associated symptoms: rhinorrhea, shortness of breath and wheezing    Associated symptoms: no ear pain and no sore throat        Review of Systems   Constitutional: Negative.    HENT:  Positive for congestion and rhinorrhea. Negative for ear pain and sore throat.    Eyes: Negative.    Respiratory:  Positive for cough, shortness of breath and wheezing.    Cardiovascular: Negative.    Gastrointestinal: Negative.    Allergic/Immunologic: Negative for immunocompromised state.   All other systems reviewed and are negative.      Past Medical History:   Diagnosis Date    Allergic     Asthma     Elevated blood pressure reading 2016    Hypertension     Obesity     Pneumonia 1993       No Known Allergies    Past Surgical History:   Procedure Laterality Date    COLONOSCOPY N/A 2022    Procedure: COLONOSCOPY;  Surgeon: Shakir Baca MD;  Location: [facility] [place];  Service: Gastroenterology;  Laterality: N/A;  hemorrhoids, diverticulosis    UMBILICAL HERNIA REPAIR         Family History   Problem Relation Age of Onset    Stroke Mother         40s    Failure to thrive Mother     Failure to thrive Father     Hypertension Father     Heart attack Father         in his 50s, Quad BYPASS sx    Sleep apnea Father     Hyperlipidemia Father     Stroke Father     Seizures Father     Obesity Brother     Hypertension Brother     Heart attack Paternal Grandfather          in his 50s    No Known Problems Son        Social History     Socioeconomic History    Marital status:      Spouse name: LYNNE    Tobacco Use    Smoking status: Former     Current packs/day: 0.00     Average packs/day: 0.3 packs/day for 3.0 years (0.8 ttl pk-yrs)     Types: Cigarettes     Start date:  6/3/1986     Quit date: 1989     Years since quittin.9    Smokeless tobacco: Former     Types: Chew    Tobacco comments:     age [age] about 5 years   Vaping Use    Vaping status: Never Used   Substance and Sexual Activity    Alcohol use: Yes     Comment: Very rarely    Drug use: No    Sexual activity: Yes     Partners: Female     Birth control/protection: Surgical           Objective   Physical Exam  Vitals and nursing note reviewed.   Constitutional:       Appearance: Normal appearance.   HENT:      Head: Normocephalic and atraumatic.      Right Ear: Tympanic membrane, ear canal and external ear normal.      Left Ear: Tympanic membrane, ear canal and external ear normal.      Nose: Nose normal. No rhinorrhea.      Mouth/Throat:      Mouth: Mucous membranes are dry.      Pharynx: Oropharynx is clear. No oropharyngeal exudate or posterior oropharyngeal erythema.   Eyes:      Pupils: Pupils are equal, round, and reactive to light.   Cardiovascular:      Rate and Rhythm: Normal rate and regular rhythm.      Pulses: Normal pulses.      Heart sounds: Normal heart sounds. No murmur heard.     No friction rub. No gallop.   Pulmonary:      Effort: No accessory muscle usage, prolonged expiration, respiratory distress or retractions.      Breath sounds: Decreased air movement present. Examination of the right-upper field reveals wheezing. Examination of the left-upper field reveals wheezing. Examination of the right-middle field reveals wheezing. Examination of the left-middle field reveals wheezing. Examination of the right-lower field reveals wheezing. Examination of the left-lower field reveals wheezing. Wheezing present. No rhonchi or rales.   Abdominal:      General: Bowel sounds are normal. There is no distension.      Palpations: Abdomen is soft.      Tenderness: There is no abdominal tenderness.   Musculoskeletal:         General: No swelling or deformity. Normal range of motion.      Cervical back: Normal  range of motion and neck supple. No rigidity.   Lymphadenopathy:      Cervical: No cervical adenopathy.   Neurological:      General: No focal deficit present.      Mental Status: He is alert and oriented to person, place, and time.      GCS: GCS eye subscore is 4. GCS verbal subscore is 5. GCS motor subscore is 6.         Procedures           ED Course      Labs Reviewed   COVID-19 AND FLU A/B, NP SWAB IN TRANSPORT MEDIA 1 HR TAT - Normal    Narrative:     Fact sheet for providers: https://www.fda.gov/media/357453/download    Fact sheet for patients: https://www.fda.gov/media/293551/download    Test performed by PCR.     XR Chest 2 View  Result Date: 5/16/2025  Narrative: XR CHEST 2 VW-  HISTORY: Male who is 54 years-old, cough, short of breath  TECHNIQUE: Frontal and lateral views of the chest  COMPARISON: 12/4/2022  FINDINGS: Heart, mediastinum and pulmonary vasculature are unremarkable. No focal pulmonary consolidation, pleural effusion, or pneumothorax. No acute osseous process.      Impression: No evidence for acute pulmonary process. Follow-up as clinical indications persist.  This report was finalized on 5/16/2025 1:40 PM by Dr. Jarett Tinoco M.D on Workstation: [email or web address]                                           Medical Decision Making  Problems Addressed:  Acute bronchitis, unspecified organism: complicated acute illness or injury  Exacerbation of intermittent asthma, unspecified asthma severity: complicated acute illness or injury    Amount and/or Complexity of Data Reviewed  Radiology: ordered.    Risk  Prescription drug management.        Final diagnoses:   Exacerbation of intermittent asthma, unspecified asthma severity   Acute bronchitis, unspecified organism       ED Disposition  ED Disposition       ED Disposition   Discharge    Condition   Good    Comment   --               No follow-up provider specified.       Medication List        New Prescriptions      azithromycin 250 MG tablet  Commonly  known as: Zithromax Z-Bala  Take 2 tablets by mouth on day 1, then 1 tablet daily on days 2-5     brompheniramine-pseudoephedrine-DM 30-2-10 MG/5ML syrup  Take 5 mL by mouth 4 (Four) Times a Day As Needed for Congestion or Cough.     predniSONE 20 MG tablet  Commonly known as: DELTASONE  Take 2 tablets by mouth Daily for 7 days.            Changed      * albuterol sulfate  (90 Base) MCG/ACT inhaler  Commonly known as: PROVENTIL HFA;VENTOLIN HFA;PROAIR HFA  INHALE 2 PUFFS BY MOUTH EVERY 4 HOURS AS NEEDED FOR WHEEZING OR SHORTNESS OF AIR  What changed: Another medication with the same name was added. Make sure you understand how and when to take each.     * albuterol sulfate  (90 Base) MCG/ACT inhaler  Commonly known as: PROVENTIL HFA;VENTOLIN HFA;PROAIR HFA  Inhale 2 puffs Every 4 (Four) Hours As Needed for Wheezing.  What changed: Another medication with the same name was added. Make sure you understand how and when to take each.     * albuterol sulfate  (90 Base) MCG/ACT inhaler  Commonly known as: PROVENTIL HFA;VENTOLIN HFA;PROAIR HFA  Inhale 2 puffs Every 4 (Four) Hours As Needed for Wheezing or Shortness of Air.  What changed: You were already taking a medication with the same name, and this prescription was added. Make sure you understand how and when to take each.           * This list has 3 medication(s) that are the same as other medications prescribed for you. Read the directions carefully, and ask your doctor or other care provider to review them with you.                   Where to Get Your Medications        These medications were sent to [facility] [id number] - [place], [place] - [address] [address] AT [facility] [facility] [address] - [phone]  - [phone]   [address] [address] [address]      Phone: [phone]   albuterol sulfate  (90 Base) MCG/ACT inhaler  azithromycin 250 MG tablet  brompheniramine-pseudoephedrine-DM 30-2-10 MG/5ML syrup  predniSONE 20  MG tablet

## 2025-05-29 RX ORDER — LISINOPRIL 10 MG/1
10 TABLET ORAL DAILY
Qty: 30 TABLET | Refills: 0 | Status: SHIPPED | OUTPATIENT
Start: 2025-05-29

## 2025-07-02 RX ORDER — LISINOPRIL 10 MG/1
10 TABLET ORAL DAILY
Qty: 30 TABLET | Refills: 0 | Status: SHIPPED | OUTPATIENT
Start: 2025-07-02

## 2025-07-03 ENCOUNTER — TELEPHONE (OUTPATIENT)
Dept: INTERNAL MEDICINE | Facility: CLINIC | Age: 55
End: 2025-07-03

## 2025-07-03 NOTE — TELEPHONE ENCOUNTER
Left message for patient to call the office to schedule his annual physical with fasting labs prior.

## 2025-07-03 NOTE — TELEPHONE ENCOUNTER
----- Message from Alicia MAY sent at 7/2/2025  2:41 PM EDT -----  Please schedule annual with fasting labs, thanks.

## 2025-08-12 RX ORDER — LISINOPRIL 10 MG/1
10 TABLET ORAL DAILY
Qty: 7 TABLET | Refills: 0 | Status: SHIPPED | OUTPATIENT
Start: 2025-08-12

## (undated) DEVICE — CANN NASL CO2 TRULINK W/O2 A/

## (undated) DEVICE — FLEX ADVANTAGE 1500CC: Brand: FLEX ADVANTAGE

## (undated) DEVICE — GOWN PROC ENDOARMOR GI LVL3 HY/SHLD UNIV

## (undated) DEVICE — GOWN ISOL W/THUMB UNIV BLU BX/15

## (undated) DEVICE — Device

## (undated) DEVICE — KT ORCA ORCAPOD DISP STRL